# Patient Record
Sex: FEMALE | Race: WHITE | NOT HISPANIC OR LATINO | Employment: UNEMPLOYED | URBAN - METROPOLITAN AREA
[De-identification: names, ages, dates, MRNs, and addresses within clinical notes are randomized per-mention and may not be internally consistent; named-entity substitution may affect disease eponyms.]

---

## 2021-08-26 PROBLEM — B95.2 UTI (URINARY TRACT INFECTION) DUE TO ENTEROCOCCUS: Status: ACTIVE | Noted: 2021-02-19

## 2021-08-26 PROBLEM — N39.0 UTI (URINARY TRACT INFECTION) DUE TO ENTEROCOCCUS: Status: ACTIVE | Noted: 2021-02-19

## 2021-08-26 PROBLEM — Z78.9 RUBELLA IMMUNE: Status: ACTIVE | Noted: 2021-08-26

## 2021-08-26 PROBLEM — U07.1 COVID-19: Status: ACTIVE | Noted: 2021-08-26

## 2021-08-26 PROBLEM — Z91.51 HISTORY OF SUICIDE ATTEMPT: Status: ACTIVE | Noted: 2021-08-26

## 2021-11-08 PROBLEM — U07.1 COVID-19: Status: RESOLVED | Noted: 2021-08-26 | Resolved: 2021-11-08

## 2021-11-08 PROBLEM — B95.2 UTI (URINARY TRACT INFECTION) DUE TO ENTEROCOCCUS: Status: RESOLVED | Noted: 2021-02-19 | Resolved: 2021-11-08

## 2021-11-08 PROBLEM — N39.0 UTI (URINARY TRACT INFECTION) DUE TO ENTEROCOCCUS: Status: RESOLVED | Noted: 2021-02-19 | Resolved: 2021-11-08

## 2021-11-08 PROBLEM — Z78.9 RUBELLA IMMUNE: Status: RESOLVED | Noted: 2021-08-26 | Resolved: 2021-11-08

## 2021-11-08 PROBLEM — F41.9 ANXIETY: Status: ACTIVE | Noted: 2021-11-08

## 2021-11-08 PROBLEM — Z91.51 HISTORY OF SUICIDE ATTEMPT: Status: RESOLVED | Noted: 2021-08-26 | Resolved: 2021-11-08

## 2021-12-27 PROBLEM — Z30.9 CONTRACEPTION MANAGEMENT: Status: ACTIVE | Noted: 2021-12-27

## 2021-12-27 PROBLEM — Z23 NEED FOR HPV VACCINATION: Status: ACTIVE | Noted: 2021-12-27

## 2023-08-02 ENCOUNTER — LAB VISIT (OUTPATIENT)
Dept: LAB | Facility: HOSPITAL | Age: 23
End: 2023-08-02
Attending: OBSTETRICS & GYNECOLOGY
Payer: MEDICAID

## 2023-08-02 DIAGNOSIS — Z34.91 FIRST TRIMESTER PREGNANCY: Primary | ICD-10-CM

## 2023-08-02 LAB
ERYTHROCYTE [DISTWIDTH] IN BLOOD BY AUTOMATED COUNT: 12.5 % (ref 11.5–17)
GROUP & RH: NORMAL
HBV SURFACE AG SERPL QL IA: NONREACTIVE
HCT VFR BLD AUTO: 35.8 % (ref 37–47)
HGB BLD-MCNC: 11.9 G/DL (ref 12–16)
HIV 1+2 AB+HIV1 P24 AG SERPL QL IA: NONREACTIVE
INDIRECT COOMBS GEL: NORMAL
MCH RBC QN AUTO: 31.3 PG (ref 27–31)
MCHC RBC AUTO-ENTMCNC: 33.2 G/DL (ref 33–36)
MCV RBC AUTO: 94.2 FL (ref 80–94)
NRBC BLD AUTO-RTO: 0 %
PLATELET # BLD AUTO: 245 X10(3)/MCL (ref 130–400)
PMV BLD AUTO: 10.1 FL (ref 7.4–10.4)
RBC # BLD AUTO: 3.8 X10(6)/MCL (ref 4.2–5.4)
SPECIMEN OUTDATE: NORMAL
T PALLIDUM AB SER QL: NONREACTIVE
WBC # SPEC AUTO: 6.31 X10(3)/MCL (ref 4.5–11.5)

## 2023-08-02 PROCEDURE — 87088 URINE BACTERIA CULTURE: CPT

## 2023-08-02 PROCEDURE — 87340 HEPATITIS B SURFACE AG IA: CPT

## 2023-08-02 PROCEDURE — 36415 COLL VENOUS BLD VENIPUNCTURE: CPT

## 2023-08-02 PROCEDURE — 85027 COMPLETE CBC AUTOMATED: CPT

## 2023-08-02 PROCEDURE — 87389 HIV-1 AG W/HIV-1&-2 AB AG IA: CPT

## 2023-08-02 PROCEDURE — 86900 BLOOD TYPING SEROLOGIC ABO: CPT | Performed by: OBSTETRICS & GYNECOLOGY

## 2023-08-02 PROCEDURE — 86780 TREPONEMA PALLIDUM: CPT

## 2023-08-02 PROCEDURE — 85660 RBC SICKLE CELL TEST: CPT

## 2023-08-02 PROCEDURE — 86762 RUBELLA ANTIBODY: CPT

## 2023-08-03 LAB
HGB S BLD QL SOLY: NEGATIVE
RUBV IGG SERPL IA-ACNC: 4.2
RUBV IGG SERPL QL IA: POSITIVE

## 2023-08-04 LAB — BACTERIA UR CULT: NORMAL

## 2023-11-28 ENCOUNTER — LAB VISIT (OUTPATIENT)
Dept: LAB | Facility: HOSPITAL | Age: 23
End: 2023-11-28
Attending: OBSTETRICS & GYNECOLOGY
Payer: MEDICAID

## 2023-11-28 DIAGNOSIS — Z34.80 PRENATAL CARE, SUBSEQUENT PREGNANCY: Primary | ICD-10-CM

## 2023-11-28 LAB
ERYTHROCYTE [DISTWIDTH] IN BLOOD BY AUTOMATED COUNT: 13.3 % (ref 11.5–17)
GLUCOSE 1H P 100 G GLC PO SERPL-MCNC: 139 MG/DL (ref 100–180)
HCT VFR BLD AUTO: 35.9 % (ref 37–47)
HGB BLD-MCNC: 12.3 G/DL (ref 12–16)
MCH RBC QN AUTO: 32.4 PG (ref 27–31)
MCHC RBC AUTO-ENTMCNC: 34.3 G/DL (ref 33–36)
MCV RBC AUTO: 94.5 FL (ref 80–94)
NRBC BLD AUTO-RTO: 0 %
PLATELET # BLD AUTO: 213 X10(3)/MCL (ref 130–400)
PMV BLD AUTO: 9.6 FL (ref 7.4–10.4)
RBC # BLD AUTO: 3.8 X10(6)/MCL (ref 4.2–5.4)
WBC # SPEC AUTO: 7.78 X10(3)/MCL (ref 4.5–11.5)

## 2023-11-28 PROCEDURE — 36415 COLL VENOUS BLD VENIPUNCTURE: CPT

## 2023-11-28 PROCEDURE — 82950 GLUCOSE TEST: CPT

## 2023-11-28 PROCEDURE — 85027 COMPLETE CBC AUTOMATED: CPT

## 2023-12-16 ENCOUNTER — HOSPITAL ENCOUNTER (INPATIENT)
Facility: HOSPITAL | Age: 23
LOS: 2 days | Discharge: HOME | End: 2023-12-18
Attending: OBSTETRICS & GYNECOLOGY | Admitting: OBSTETRICS & GYNECOLOGY
Payer: MEDICAID

## 2023-12-16 DIAGNOSIS — O36.4XX0 FETAL DEMISE, GREATER THAN 22 WEEKS, ANTEPARTUM: ICD-10-CM

## 2023-12-16 LAB
BASOPHILS # BLD AUTO: 0.02 X10(3)/MCL
BASOPHILS NFR BLD AUTO: 0.2 %
EOSINOPHIL # BLD AUTO: 0.02 X10(3)/MCL (ref 0–0.9)
EOSINOPHIL NFR BLD AUTO: 0.2 %
ERYTHROCYTE [DISTWIDTH] IN BLOOD BY AUTOMATED COUNT: 13.2 % (ref 11.5–17)
GROUP & RH: NORMAL
HCT VFR BLD AUTO: 36.9 % (ref 37–47)
HGB BLD-MCNC: 12.9 G/DL (ref 12–16)
IMM GRANULOCYTES # BLD AUTO: 0.07 X10(3)/MCL (ref 0–0.04)
IMM GRANULOCYTES NFR BLD AUTO: 0.7 %
INDIRECT COOMBS GEL: NORMAL
LYMPHOCYTES # BLD AUTO: 1.51 X10(3)/MCL (ref 0.6–4.6)
LYMPHOCYTES NFR BLD AUTO: 14.3 %
MCH RBC QN AUTO: 32.4 PG (ref 27–31)
MCHC RBC AUTO-ENTMCNC: 35 G/DL (ref 33–36)
MCV RBC AUTO: 92.7 FL (ref 80–94)
MONOCYTES # BLD AUTO: 0.45 X10(3)/MCL (ref 0.1–1.3)
MONOCYTES NFR BLD AUTO: 4.3 %
NEUTROPHILS # BLD AUTO: 8.5 X10(3)/MCL (ref 2.1–9.2)
NEUTROPHILS NFR BLD AUTO: 80.3 %
NRBC BLD AUTO-RTO: 0 %
PLATELET # BLD AUTO: 208 X10(3)/MCL (ref 130–400)
PMV BLD AUTO: 9.7 FL (ref 7.4–10.4)
RBC # BLD AUTO: 3.98 X10(6)/MCL (ref 4.2–5.4)
SPECIMEN OUTDATE: NORMAL
WBC # SPEC AUTO: 10.57 X10(3)/MCL (ref 4.5–11.5)

## 2023-12-16 PROCEDURE — 25000003 PHARM REV CODE 250: Performed by: OBSTETRICS & GYNECOLOGY

## 2023-12-16 PROCEDURE — 85025 COMPLETE CBC W/AUTO DIFF WBC: CPT | Performed by: OBSTETRICS & GYNECOLOGY

## 2023-12-16 PROCEDURE — 25000003 PHARM REV CODE 250

## 2023-12-16 PROCEDURE — 86901 BLOOD TYPING SEROLOGIC RH(D): CPT | Performed by: OBSTETRICS & GYNECOLOGY

## 2023-12-16 PROCEDURE — 63600175 PHARM REV CODE 636 W HCPCS: Performed by: OBSTETRICS & GYNECOLOGY

## 2023-12-16 PROCEDURE — 11000001 HC ACUTE MED/SURG PRIVATE ROOM

## 2023-12-16 PROCEDURE — 51702 INSERT TEMP BLADDER CATH: CPT

## 2023-12-16 RX ORDER — OXYTOCIN 10 [USP'U]/ML
10 INJECTION, SOLUTION INTRAMUSCULAR; INTRAVENOUS ONCE AS NEEDED
Status: DISCONTINUED | OUTPATIENT
Start: 2023-12-16 | End: 2023-12-18 | Stop reason: HOSPADM

## 2023-12-16 RX ORDER — OXYTOCIN/RINGER'S LACTATE 30/500 ML
334 PLASTIC BAG, INJECTION (ML) INTRAVENOUS ONCE AS NEEDED
Status: DISCONTINUED | OUTPATIENT
Start: 2023-12-16 | End: 2023-12-18 | Stop reason: HOSPADM

## 2023-12-16 RX ORDER — PROCHLORPERAZINE EDISYLATE 5 MG/ML
5 INJECTION INTRAMUSCULAR; INTRAVENOUS EVERY 6 HOURS PRN
Status: DISCONTINUED | OUTPATIENT
Start: 2023-12-16 | End: 2023-12-17

## 2023-12-16 RX ORDER — SODIUM CITRATE AND CITRIC ACID MONOHYDRATE 334; 500 MG/5ML; MG/5ML
30 SOLUTION ORAL ONCE
Status: CANCELLED | OUTPATIENT
Start: 2023-12-16 | End: 2023-12-16

## 2023-12-16 RX ORDER — MISOPROSTOL 100 UG/1
100 TABLET ORAL EVERY 4 HOURS PRN
Status: DISCONTINUED | OUTPATIENT
Start: 2023-12-16 | End: 2023-12-16

## 2023-12-16 RX ORDER — SODIUM CHLORIDE, SODIUM LACTATE, POTASSIUM CHLORIDE, CALCIUM CHLORIDE 600; 310; 30; 20 MG/100ML; MG/100ML; MG/100ML; MG/100ML
INJECTION, SOLUTION INTRAVENOUS CONTINUOUS
Status: DISCONTINUED | OUTPATIENT
Start: 2023-12-16 | End: 2023-12-17

## 2023-12-16 RX ORDER — CALCIUM CARBONATE 200(500)MG
500 TABLET,CHEWABLE ORAL 3 TIMES DAILY PRN
Status: DISCONTINUED | OUTPATIENT
Start: 2023-12-16 | End: 2023-12-18 | Stop reason: HOSPADM

## 2023-12-16 RX ORDER — ACETAMINOPHEN 325 MG/1
650 TABLET ORAL EVERY 6 HOURS PRN
Status: DISCONTINUED | OUTPATIENT
Start: 2023-12-16 | End: 2023-12-18 | Stop reason: HOSPADM

## 2023-12-16 RX ORDER — LIDOCAINE HYDROCHLORIDE 10 MG/ML
10 INJECTION INFILTRATION; PERINEURAL ONCE AS NEEDED
Status: DISCONTINUED | OUTPATIENT
Start: 2023-12-16 | End: 2023-12-17

## 2023-12-16 RX ORDER — MISOPROSTOL 100 UG/1
800 TABLET ORAL ONCE AS NEEDED
Status: DISCONTINUED | OUTPATIENT
Start: 2023-12-16 | End: 2023-12-18 | Stop reason: HOSPADM

## 2023-12-16 RX ORDER — EPHEDRINE SULFATE 50 MG/ML
INJECTION, SOLUTION INTRAVENOUS
Status: COMPLETED
Start: 2023-12-16 | End: 2023-12-16

## 2023-12-16 RX ORDER — MISOPROSTOL 100 UG/1
100 TABLET ORAL EVERY 4 HOURS
Status: DISCONTINUED | OUTPATIENT
Start: 2023-12-16 | End: 2023-12-17

## 2023-12-16 RX ORDER — OXYTOCIN/RINGER'S LACTATE 30/500 ML
95 PLASTIC BAG, INJECTION (ML) INTRAVENOUS ONCE
Status: DISCONTINUED | OUTPATIENT
Start: 2023-12-16 | End: 2023-12-18 | Stop reason: HOSPADM

## 2023-12-16 RX ORDER — OXYTOCIN/RINGER'S LACTATE 30/500 ML
334 PLASTIC BAG, INJECTION (ML) INTRAVENOUS ONCE
Status: COMPLETED | OUTPATIENT
Start: 2023-12-16 | End: 2023-12-17

## 2023-12-16 RX ORDER — DIPHENOXYLATE HYDROCHLORIDE AND ATROPINE SULFATE 2.5; .025 MG/1; MG/1
2 TABLET ORAL EVERY 6 HOURS PRN
Status: DISCONTINUED | OUTPATIENT
Start: 2023-12-16 | End: 2023-12-18 | Stop reason: HOSPADM

## 2023-12-16 RX ORDER — SIMETHICONE 80 MG
1 TABLET,CHEWABLE ORAL 4 TIMES DAILY PRN
Status: DISCONTINUED | OUTPATIENT
Start: 2023-12-16 | End: 2023-12-17

## 2023-12-16 RX ORDER — OXYTOCIN/RINGER'S LACTATE 30/500 ML
95 PLASTIC BAG, INJECTION (ML) INTRAVENOUS ONCE AS NEEDED
Status: DISCONTINUED | OUTPATIENT
Start: 2023-12-16 | End: 2023-12-18 | Stop reason: HOSPADM

## 2023-12-16 RX ORDER — ONDANSETRON 2 MG/ML
4 INJECTION INTRAMUSCULAR; INTRAVENOUS EVERY 6 HOURS PRN
Status: DISCONTINUED | OUTPATIENT
Start: 2023-12-16 | End: 2023-12-18 | Stop reason: HOSPADM

## 2023-12-16 RX ORDER — METHYLERGONOVINE MALEATE 0.2 MG/ML
200 INJECTION INTRAVENOUS ONCE AS NEEDED
Status: COMPLETED | OUTPATIENT
Start: 2023-12-16 | End: 2023-12-17

## 2023-12-16 RX ORDER — CARBOPROST TROMETHAMINE 250 UG/ML
250 INJECTION, SOLUTION INTRAMUSCULAR
Status: DISCONTINUED | OUTPATIENT
Start: 2023-12-16 | End: 2023-12-18 | Stop reason: HOSPADM

## 2023-12-16 RX ORDER — EPHEDRINE SULFATE 50 MG/ML
10 INJECTION, SOLUTION INTRAVENOUS ONCE AS NEEDED
Status: CANCELLED | OUTPATIENT
Start: 2023-12-16 | End: 2035-05-14

## 2023-12-16 RX ORDER — FENTANYL/BUPIVACAINE/NS/PF 2-1250MCG
PLASTIC BAG, INJECTION (ML) INJECTION CONTINUOUS
Status: DISCONTINUED | OUTPATIENT
Start: 2023-12-16 | End: 2023-12-17

## 2023-12-16 RX ADMIN — EPHEDRINE SULFATE 10 MG: 50 INJECTION INTRAVENOUS at 08:12

## 2023-12-16 RX ADMIN — MISOPROSTOL 100 MCG: 100 TABLET ORAL at 06:12

## 2023-12-16 RX ADMIN — MISOPROSTOL 100 MCG: 100 TABLET ORAL at 10:12

## 2023-12-16 RX ADMIN — SODIUM CHLORIDE, POTASSIUM CHLORIDE, SODIUM LACTATE AND CALCIUM CHLORIDE: 600; 310; 30; 20 INJECTION, SOLUTION INTRAVENOUS at 06:12

## 2023-12-17 PROBLEM — Z3A.32 32 WEEKS GESTATION OF PREGNANCY: Status: ACTIVE | Noted: 2023-12-17

## 2023-12-17 LAB
ALBUMIN SERPL-MCNC: 2.5 G/DL (ref 3.5–5)
ALBUMIN/GLOB SERPL: 1 RATIO (ref 1.1–2)
ALP SERPL-CCNC: 91 UNIT/L (ref 40–150)
ALT SERPL-CCNC: 8 UNIT/L (ref 0–55)
APTT PPP: 25.8 SECONDS (ref 23.2–33.7)
AST SERPL-CCNC: 13 UNIT/L (ref 5–34)
BASOPHILS # BLD AUTO: 0.03 X10(3)/MCL
BASOPHILS # BLD AUTO: 0.04 X10(3)/MCL
BASOPHILS NFR BLD AUTO: 0.2 %
BASOPHILS NFR BLD AUTO: 0.3 %
BILIRUB SERPL-MCNC: 0.5 MG/DL
BUN SERPL-MCNC: 3 MG/DL (ref 7–18.7)
CALCIUM SERPL-MCNC: 8.1 MG/DL (ref 8.4–10.2)
CHLORIDE SERPL-SCNC: 110 MMOL/L (ref 98–107)
CO2 SERPL-SCNC: 21 MMOL/L (ref 22–29)
CREAT SERPL-MCNC: 0.54 MG/DL (ref 0.55–1.02)
EOSINOPHIL # BLD AUTO: 0.01 X10(3)/MCL (ref 0–0.9)
EOSINOPHIL # BLD AUTO: 0.02 X10(3)/MCL (ref 0–0.9)
EOSINOPHIL NFR BLD AUTO: 0.1 %
EOSINOPHIL NFR BLD AUTO: 0.2 %
ERYTHROCYTE [DISTWIDTH] IN BLOOD BY AUTOMATED COUNT: 13.1 % (ref 11.5–17)
ERYTHROCYTE [DISTWIDTH] IN BLOOD BY AUTOMATED COUNT: 13.2 % (ref 11.5–17)
FIBRINOGEN PPP-MCNC: 305 MG/DL (ref 210–463)
GFR SERPLBLD CREATININE-BSD FMLA CKD-EPI: >60 MLS/MIN/1.73/M2
GLOBULIN SER-MCNC: 2.4 GM/DL (ref 2.4–3.5)
GLUCOSE SERPL-MCNC: 103 MG/DL (ref 74–100)
HCT VFR BLD AUTO: 30.8 % (ref 37–47)
HCT VFR BLD AUTO: 32.2 % (ref 37–47)
HGB BLD-MCNC: 10.7 G/DL (ref 12–16)
HGB BLD-MCNC: 11.2 G/DL (ref 12–16)
IMM GRANULOCYTES # BLD AUTO: 0.07 X10(3)/MCL (ref 0–0.04)
IMM GRANULOCYTES # BLD AUTO: 0.11 X10(3)/MCL (ref 0–0.04)
IMM GRANULOCYTES NFR BLD AUTO: 0.6 %
IMM GRANULOCYTES NFR BLD AUTO: 0.7 %
INR PPP: 1
LYMPHOCYTES # BLD AUTO: 2.13 X10(3)/MCL (ref 0.6–4.6)
LYMPHOCYTES # BLD AUTO: 2.67 X10(3)/MCL (ref 0.6–4.6)
LYMPHOCYTES NFR BLD AUTO: 12.3 %
LYMPHOCYTES NFR BLD AUTO: 24.9 %
MCH RBC QN AUTO: 32.5 PG (ref 27–31)
MCH RBC QN AUTO: 32.7 PG (ref 27–31)
MCHC RBC AUTO-ENTMCNC: 34.7 G/DL (ref 33–36)
MCHC RBC AUTO-ENTMCNC: 34.8 G/DL (ref 33–36)
MCV RBC AUTO: 93.6 FL (ref 80–94)
MCV RBC AUTO: 94.2 FL (ref 80–94)
MONOCYTES # BLD AUTO: 0.64 X10(3)/MCL (ref 0.1–1.3)
MONOCYTES # BLD AUTO: 1 X10(3)/MCL (ref 0.1–1.3)
MONOCYTES NFR BLD AUTO: 5.8 %
MONOCYTES NFR BLD AUTO: 6 %
NEUTROPHILS # BLD AUTO: 14.06 X10(3)/MCL (ref 2.1–9.2)
NEUTROPHILS # BLD AUTO: 7.29 X10(3)/MCL (ref 2.1–9.2)
NEUTROPHILS NFR BLD AUTO: 67.9 %
NEUTROPHILS NFR BLD AUTO: 81 %
NRBC BLD AUTO-RTO: 0 %
NRBC BLD AUTO-RTO: 0 %
PLATELET # BLD AUTO: 214 X10(3)/MCL (ref 130–400)
PLATELET # BLD AUTO: 232 X10(3)/MCL (ref 130–400)
PMV BLD AUTO: 9.6 FL (ref 7.4–10.4)
PMV BLD AUTO: 9.7 FL (ref 7.4–10.4)
POTASSIUM SERPL-SCNC: 3.5 MMOL/L (ref 3.5–5.1)
PROT SERPL-MCNC: 4.9 GM/DL (ref 6.4–8.3)
PROTHROMBIN TIME: 13.2 SECONDS (ref 12.5–14.5)
RBC # BLD AUTO: 3.29 X10(6)/MCL (ref 4.2–5.4)
RBC # BLD AUTO: 3.42 X10(6)/MCL (ref 4.2–5.4)
SODIUM SERPL-SCNC: 139 MMOL/L (ref 136–145)
WBC # SPEC AUTO: 10.72 X10(3)/MCL (ref 4.5–11.5)
WBC # SPEC AUTO: 17.35 X10(3)/MCL (ref 4.5–11.5)

## 2023-12-17 PROCEDURE — 85730 THROMBOPLASTIN TIME PARTIAL: CPT | Performed by: OBSTETRICS & GYNECOLOGY

## 2023-12-17 PROCEDURE — 72100002 HC LABOR CARE, 1ST 8 HOURS

## 2023-12-17 PROCEDURE — 25000003 PHARM REV CODE 250: Performed by: OBSTETRICS & GYNECOLOGY

## 2023-12-17 PROCEDURE — 72200006 HC VAGINAL DELIVERY LEVEL III

## 2023-12-17 PROCEDURE — 85610 PROTHROMBIN TIME: CPT | Performed by: OBSTETRICS & GYNECOLOGY

## 2023-12-17 PROCEDURE — 85384 FIBRINOGEN ACTIVITY: CPT | Performed by: OBSTETRICS & GYNECOLOGY

## 2023-12-17 PROCEDURE — 63600175 PHARM REV CODE 636 W HCPCS: Performed by: OBSTETRICS & GYNECOLOGY

## 2023-12-17 PROCEDURE — 11000001 HC ACUTE MED/SURG PRIVATE ROOM

## 2023-12-17 PROCEDURE — 88307 TISSUE EXAM BY PATHOLOGIST: CPT | Performed by: OBSTETRICS & GYNECOLOGY

## 2023-12-17 PROCEDURE — 86923 COMPATIBILITY TEST ELECTRIC: CPT | Performed by: OBSTETRICS & GYNECOLOGY

## 2023-12-17 PROCEDURE — 85025 COMPLETE CBC W/AUTO DIFF WBC: CPT | Performed by: OBSTETRICS & GYNECOLOGY

## 2023-12-17 PROCEDURE — 80053 COMPREHEN METABOLIC PANEL: CPT | Performed by: OBSTETRICS & GYNECOLOGY

## 2023-12-17 RX ORDER — SIMETHICONE 80 MG
1 TABLET,CHEWABLE ORAL EVERY 6 HOURS PRN
Status: DISCONTINUED | OUTPATIENT
Start: 2023-12-17 | End: 2023-12-18 | Stop reason: HOSPADM

## 2023-12-17 RX ORDER — IBUPROFEN 600 MG/1
600 TABLET ORAL EVERY 6 HOURS PRN
Status: DISCONTINUED | OUTPATIENT
Start: 2023-12-17 | End: 2023-12-18 | Stop reason: HOSPADM

## 2023-12-17 RX ORDER — ONDANSETRON 4 MG/1
8 TABLET, ORALLY DISINTEGRATING ORAL EVERY 8 HOURS PRN
Status: DISCONTINUED | OUTPATIENT
Start: 2023-12-17 | End: 2023-12-18 | Stop reason: HOSPADM

## 2023-12-17 RX ORDER — HYDROCORTISONE 25 MG/G
CREAM TOPICAL 3 TIMES DAILY PRN
Status: DISCONTINUED | OUTPATIENT
Start: 2023-12-17 | End: 2023-12-18 | Stop reason: HOSPADM

## 2023-12-17 RX ORDER — HYDROCODONE BITARTRATE AND ACETAMINOPHEN 500; 5 MG/1; MG/1
TABLET ORAL
Status: DISCONTINUED | OUTPATIENT
Start: 2023-12-17 | End: 2023-12-18 | Stop reason: HOSPADM

## 2023-12-17 RX ORDER — DOCUSATE SODIUM 100 MG/1
200 CAPSULE, LIQUID FILLED ORAL 2 TIMES DAILY PRN
Status: DISCONTINUED | OUTPATIENT
Start: 2023-12-17 | End: 2023-12-18 | Stop reason: HOSPADM

## 2023-12-17 RX ORDER — PROCHLORPERAZINE EDISYLATE 5 MG/ML
5 INJECTION INTRAMUSCULAR; INTRAVENOUS EVERY 6 HOURS PRN
Status: DISCONTINUED | OUTPATIENT
Start: 2023-12-17 | End: 2023-12-18 | Stop reason: HOSPADM

## 2023-12-17 RX ORDER — ACETAMINOPHEN 325 MG/1
650 TABLET ORAL EVERY 6 HOURS SCHEDULED
Status: DISCONTINUED | OUTPATIENT
Start: 2023-12-17 | End: 2023-12-17

## 2023-12-17 RX ORDER — OXYCODONE AND ACETAMINOPHEN 5; 325 MG/1; MG/1
1 TABLET ORAL EVERY 4 HOURS PRN
Status: DISCONTINUED | OUTPATIENT
Start: 2023-12-17 | End: 2023-12-18 | Stop reason: HOSPADM

## 2023-12-17 RX ORDER — OXYCODONE AND ACETAMINOPHEN 10; 325 MG/1; MG/1
1 TABLET ORAL EVERY 4 HOURS PRN
Status: DISCONTINUED | OUTPATIENT
Start: 2023-12-17 | End: 2023-12-18 | Stop reason: HOSPADM

## 2023-12-17 RX ADMIN — Medication 334 MILLI-UNITS/MIN: at 01:12

## 2023-12-17 RX ADMIN — TRANEXAMIC ACID 1000 MG: 100 INJECTION, SOLUTION INTRAVENOUS at 02:12

## 2023-12-17 RX ADMIN — ACETAMINOPHEN 325MG 650 MG: 325 TABLET ORAL at 12:12

## 2023-12-17 RX ADMIN — IBUPROFEN 600 MG: 600 TABLET, FILM COATED ORAL at 05:12

## 2023-12-17 RX ADMIN — ONDANSETRON 4 MG: 2 INJECTION INTRAMUSCULAR; INTRAVENOUS at 02:12

## 2023-12-17 RX ADMIN — IBUPROFEN 600 MG: 600 TABLET, FILM COATED ORAL at 12:12

## 2023-12-17 RX ADMIN — METHYLERGONOVINE MALEATE 200 MCG: 0.2 INJECTION, SOLUTION INTRAMUSCULAR; INTRAVENOUS at 01:12

## 2023-12-17 NOTE — H&P
[facility] - Labor and Delivery  Obstetrics  History & Physical    Patient Name: Viviane Garnica  MRN: [id number]  Admission Date: 2023  Primary Care Provider: Reny, Primary Doctor    Subjective:     Principal Problem:<principal problem not specified>    History of Present Illness: 32 weeks with IUFD    Obstetric HPI:  Patient reports None contractions, decreased  fetal movement, No vaginal bleeding , No loss of fluid     This pregnancy has been complicated by no issues    OB History    Para Term  AB Living   2 1 1 0 0 1   SAB IAB Ectopic Multiple Live Births   0 0 0 0 1      # Outcome Date GA Lbr Ward/2nd Weight Sex Delivery Anes PTL Lv   2 Current            1 Term 21 37w6d  2.892 kg (6 lb 6 oz) F Vag-Spont EPI N       Name: Nadia     Past Medical History:   Diagnosis Date    Anxiety state 2021 1:28:10 PM    [facility] - [facility]: Anxiety-No Additional Notes    Anxiety state 2021 1:28:10 PM    [facility] - [facility]: Anxiety-No Additional Notes    Depression     Mental disorder     Anxiety and Depression    Other depressive disorder 2021 1:28:03 PM    [facility]: Depression-No Additional Notes    Other depressive disorder 2021 1:28:03 PM    [facility] - HA: Depression-No Additional Notes     Past Surgical History:   Procedure Laterality Date    HAND SURGERY      TONSILLECTOMY      WISDOM TOOTH EXTRACTION         PTA Medications   Medication Sig    prenatal vit103-iron fum-folic () 27 mg iron- 1 mg Tab  27 mg iron-1 mg tablet take 1 tablet by oral route once daily for 30 days 2021  Active    PROCTOSOL HC 2.5 % rectal cream     ALPRAZolam (XANAX) 0.5 MG tablet     dextroamphetamine-amphetamine (ADDERALL) 15 mg tablet     ibuprofen (ADVIL,MOTRIN) 600 MG tablet     norelgestromin-ethinyl estradiol (XULANE) 150-35 mcg/24 hr Apply 1 patch by transdermal route once weekly for 3 weeks.        Review of patient's allergies indicates:  No Known Allergies     Family History       Problem Relation (Age of Onset)    Breast cancer Maternal Grandmother    Hypertension Mother          Tobacco Use    Smoking status: Never    Smokeless tobacco: Never   Substance and Sexual Activity    Alcohol use: Not Currently    Drug use: Never    Sexual activity: Yes     Partners: Male     Review of Systems   Objective:     Vital Signs (Most Recent):  Temp: 98.9 °F (37.2 °C) (230)  Pulse: (!) 116 (23)  Resp: 18 (23)  BP: (!) 99/57 (23)  SpO2: 96 % (23) Vital Signs (24h Range):  Temp:  [98.9 °F (37.2 °C)-99.5 °F (37.5 °C)] 98.9 °F (37.2 °C)  Pulse:  [] 116  Resp:  [12-18] 12  SpO2:  [96 %-100 %] 96 %  BP: ()/(57-98) 99/57     Weight: 61.2 kg (135 lb)  Body mass index is 24.69 kg/m².        Physical Exam    Cervix:  Dilation:  1  Effacement:  25%  Station: -3  Presentation: Breech     Significant Labs:  Lab Results   Component Value Date    GROUPTRH O POS 2023    HEPBSAG Negative 2021       I have personallly reviewed all pertinent lab results from the last 24 hours.    Assessment/Plan:     23 y.o. female  at 32w3d for:    There are no hospital problems to display for this patient.      IUFD, will proceed with induction as per patient request    Eric Tate MD  Obstetrics  [facility] - Labor and Delivery

## 2023-12-17 NOTE — L&D DELIVERY NOTE
"[facility] - 2nd Floor Mother/Baby Unit  Vaginal Delivery   Operative Note    SUMMARY     Normal spontaneous vaginal delivery of fetal demise,   Infant delivered position OA over intact perineum.  Nuchal cord: No.    Spontaneous delivery of placenta and IV pitocin given noting good uterine tone.  No lacerations noted.  Patient tolerated delivery well. Sponge needle and lap counted correctly x2.  Partial placenta abruption, noted on delivery of placenta  Indications: <principal problem not specified>  Pregnancy complicated by:   Patient Active Problem List   Diagnosis    Anxiety    Postpartum exam    Contraception management    Need for HPV vaccination     Admitting GA: 32w3d    Delivery Information for Girl Viviane Garnica    Birth information:  YOB: 2024   Time of birth: 1:52 AM   Sex: female   Head Delivery Date/Time: 2023  1:52 AM   Delivery type: Vaginal, Spontaneous   Gestational Age: 32w3d        Delivery Providers    Delivering clinician: Eric Tate MD   Provider Role    Radha Dave RN Wiley, Tristan, RN Vige', Mary Elise, RN Saunders, Valerie, RN               Measurements    Weight: 1871 g  Weight (lbs): 4 lb 2 oz  Length: 43.2 cm  Length (in): 17"         Apgars    Living status: Fetal Demise  Apgar Component Scores:  1 min.:  5 min.:  10 min.:  15 min.:  20 min.:    Skin color:  0  0  0  0  0    Heart rate:  0  0  0  0  0    Reflex irritability:  0  0  0  0  0    Muscle tone:  0  0  0  0  0    Respiratory effort:  0  0  0  0  0    Total:  0  0  0  0  0             Operative Delivery    Forceps attempted?: No  Vacuum extractor attempted?: No         Shoulder Dystocia    Shoulder dystocia present?: No           Presentation    Presentation: Vertex  Position: Middle           Interventions/Resuscitation           Cord    Vessels: 3 vessels  Complications: None  Delayed Cord Clamping?: No  Cord Blood Disposition: Discarded  Gases Sent?: " No  Stem Cell Collection (by MD): No       Placenta    Placenta delivery date/time: 2023 0153  Placenta removal: Spontaneous  Placenta appearance: Intact  Placenta disposition: Pathology           Labor Events:       labor: Yes     Labor Onset Date/Time: 2023 18:20     Dilation Complete Date/Time: 2023 01:43     Start Pushing Date/Time: 2023 01:44     Rupture Date/Time:            Rupture type:          Fluid Amount:       Fluid Color:        steroids: None     Antibiotics given for GBS: No     Induction:       Indications for induction:        Augmentation:       Indications for augmentation:       Labor complications: Abruptio Placenta     Additional complications:          Cervical ripening:                     Delivery:      Episiotomy: None     Indication for Episiotomy:       Perineal Lacerations: None Repaired:      Periurethral Laceration:   Repaired:     Labial Laceration:   Repaired:     Sulcus Laceration:   Repaired:     Vaginal Laceration:   Repaired:     Cervical Laceration:   Repaired:     Repair suture: None     Repair # of packets:       Last Value - EBL - Nursing (mL):       Sum - EBL - Nursing (mL): 0     Last Value - EBL - Anesthesia (mL):      Calculated QBL (mL): 200      Vaginal Sweep Performed: Yes     Surgicount Correct: Yes       Other providers:       Anesthesia    Method: Epidural          Details (if applicable):  Trial of Labor      Categorization:      Priority:     Indications for :     Incision Type:       Additional  information:  Forceps:    Vacuum:    Breech:    Observed anomalies    Other (Comments):

## 2023-12-17 NOTE — PLAN OF CARE
Problem: Adult Inpatient Plan of Care  Goal: Plan of Care Review  Outcome: Ongoing, Progressing  Goal: Patient-Specific Goal (Individualized)  Outcome: Ongoing, Progressing  Goal: Absence of Hospital-Acquired Illness or Injury  Outcome: Ongoing, Progressing  Goal: Optimal Comfort and Wellbeing  Outcome: Ongoing, Progressing  Goal: Readiness for Transition of Care  Outcome: Ongoing, Progressing     Problem:  Fall Injury Risk  Goal: Absence of Fall, Infant Drop and Related Injury  Outcome: Ongoing, Progressing     Problem: Infection  Goal: Absence of Infection Signs and Symptoms  Outcome: Ongoing, Progressing     
Preventive measure

## 2023-12-18 VITALS
OXYGEN SATURATION: 96 % | HEIGHT: 62 IN | DIASTOLIC BLOOD PRESSURE: 58 MMHG | TEMPERATURE: 98 F | RESPIRATION RATE: 18 BRPM | BODY MASS INDEX: 24.84 KG/M2 | WEIGHT: 135 LBS | HEART RATE: 71 BPM | SYSTOLIC BLOOD PRESSURE: 93 MMHG

## 2023-12-18 NOTE — CONSULTS
"CM consulted for 32 wga fetal loss. Met with mom, Viviane Garnica and FOB, Brian Barr in post partum room, both expressed appropriate emotions and grief. I offered my support and comfort. Dad was holding and bonding with their baby girl at mom's bedside.  Discussed loss process and the options with parents, parents are planning to use [facility] in [place] for burial. Discussed [facility] footprints and dad and mom denied referral. Dad stated, "No, we are ok, I will make sure everything is taken care of for my baby girl." I explained [facility]'s also provide emotional support and provided mom with additional information and resources in the event the family chooses to utilize their support services. Provided additional mental health and grief resources and offered emotional support again. Main contact is mom ([phone]) or dad ([phone]). Both mom and dad had support present during assessment and denied having any additional needs or questions.     Baby girl, Юлия Mota-32.2 wga   DOL 2023.     Updated GEORGINA Maajno regarding completed consult.   "

## 2023-12-18 NOTE — NURSING
0750-parents and grandmother asked if mom could take baby downstairs to bring her outside. Sat down and spoke with parents about bringing the baby out of the room. We ultimately decided it was not a good idea. Parents and grandmother are ok with decision.

## 2023-12-19 LAB — PSYCHE PATHOLOGY RESULT: NORMAL

## 2023-12-19 NOTE — DISCHARGE SUMMARY
"[facility] - 2nd Floor Mother/Baby Unit  Obstetrics  Discharge Summary      Patient Name: Viviane Garnica  MRN: [id number]  Admission Date: 2023  Hospital Length of Stay: 2 days  Discharge Date and Time:  2023 9:49 PM  Attending Physician: Reny att. providers found   Discharging Provider: Onofre Lowe MD   Primary Care Provider: Reny, Primary Doctor    HPI: No notes on file      IUFD at 32 weeks admitted for induction    * No surgery found *     Hospital Course:   No notes on file     Consults (From admission, onward)          Status Ordering Provider     Inpatient consult to Social Work/Case Management  Once        Provider:  (Not yet assigned)    Completed ONOFRE LOWE            Final Active Diagnoses:    Diagnosis Date Noted POA    PRINCIPAL PROBLEM:  32 weeks gestation of pregnancy [Z3A.32] 2023 Not Applicable      Problems Resolved During this Admission:        Significant Diagnostic Studies: N/A      Feeding Method: IUFD    Immunizations       None            Delivery:    Episiotomy: None   Lacerations: None   Repair suture: None   Repair # of packets:     Blood loss (ml):       Birth information:  YOB: 2024   Time of birth: 1:52 AM   Sex: female   Delivery type: Vaginal, Spontaneous   Gestational Age: 28w1d     Measurements    Weight: 1871 g  Weight (lbs): 4 lb 2 oz  Length: 43.2 cm  Length (in): 17"         Delivery Clinician: Delivery Providers    Delivering clinician: Onofre Lowe MD   Provider Role    Radha Dave RN Wiley, Tristan, RN Vige', GEORGINA Babin Valerie, RN              Additional  information:  Forceps:    Vacuum:    Breech:    Observed anomalies      Living?:     Apgars    Living status: Fetal Demise  Apgar Component Scores:  1 min.:  5 min.:  10 min.:  15 min.:  20 min.:    Skin color:  0  0  0  0  0    Heart rate:  0  0  0  0  0    Reflex irritability:  0  0  0  0  0    Muscle tone:  0  0  " 0  0  0    Respiratory effort:  0  0  0  0  0    Total:  0  0  0  0  0             Placenta: Delivered:       appearance  Pending Diagnostic Studies:       Procedure Component Value Units Date/Time    Specimen to Pathology Gynecology and Obstetrics [[id number]] Collected: 12/17/23 0411    Order Status: Sent Lab Status: In process Updated: 12/18/23 1036    Specimen: Tissue             Discharged Condition: Patient without complaints, ambulating, uterus firm,minimal bleeding,ready to go home    Disposition: Home or Self Care    Follow Up:   Follow-up Information       Eric Tate MD. Schedule an appointment as soon as possible for a visit in 6 week(s).    Specialty: Obstetrics and Gynecology  Contact information:  [address] [place] [address]  [phone]                           Patient Instructions:   No discharge procedures on file.  Medications:  Discharge Medication List as of 12/18/2023 10:25 AM        STOP taking these medications       prenatal vit103-iron fum-folic () 27 mg iron- 1 mg Tab Comments:   Reason for Stopping:         PROCTOSOL HC 2.5 % rectal cream Comments:   Reason for Stopping:         ALPRAZolam (XANAX) 0.5 MG tablet Comments:   Reason for Stopping:         dextroamphetamine-amphetamine (ADDERALL) 15 mg tablet Comments:   Reason for Stopping:         ibuprofen (ADVIL,MOTRIN) 600 MG tablet Comments:   Reason for Stopping:         norelgestromin-ethinyl estradiol (XULANE) 150-35 mcg/24 hr Comments:   Reason for Stopping:               Eric Tate MD  Obstetrics  [facility] - 2nd Floor Mother/Baby Unit

## 2023-12-20 ENCOUNTER — HOSPITAL ENCOUNTER (EMERGENCY)
Facility: HOSPITAL | Age: 23
Discharge: LEFT BEFORE DISCHARGE | End: 2023-12-21
Payer: MEDICAID

## 2023-12-20 VITALS
DIASTOLIC BLOOD PRESSURE: 88 MMHG | OXYGEN SATURATION: 100 % | RESPIRATION RATE: 19 BRPM | BODY MASS INDEX: 22.08 KG/M2 | TEMPERATURE: 98 F | HEART RATE: 90 BPM | HEIGHT: 62 IN | SYSTOLIC BLOOD PRESSURE: 131 MMHG | WEIGHT: 120 LBS

## 2023-12-20 DIAGNOSIS — R07.9 CHEST PAIN: ICD-10-CM

## 2023-12-20 LAB
ABO + RH BLD: NORMAL
ABO + RH BLD: NORMAL
ALBUMIN SERPL-MCNC: 2.9 G/DL (ref 3.5–5)
ALBUMIN/GLOB SERPL: 0.9 RATIO (ref 1.1–2)
ALP SERPL-CCNC: 92 UNIT/L (ref 40–150)
ALT SERPL-CCNC: 15 UNIT/L (ref 0–55)
AST SERPL-CCNC: 18 UNIT/L (ref 5–34)
BASOPHILS # BLD AUTO: 0.04 X10(3)/MCL
BASOPHILS NFR BLD AUTO: 0.5 %
BILIRUB SERPL-MCNC: 0.3 MG/DL
BLD PROD TYP BPU: NORMAL
BLD PROD TYP BPU: NORMAL
BLOOD UNIT EXPIRATION DATE: NORMAL
BLOOD UNIT EXPIRATION DATE: NORMAL
BLOOD UNIT TYPE CODE: 5100
BLOOD UNIT TYPE CODE: 5100
BNP BLD-MCNC: 57.1 PG/ML
BUN SERPL-MCNC: 5.5 MG/DL (ref 7–18.7)
CALCIUM SERPL-MCNC: 8.2 MG/DL (ref 8.4–10.2)
CHLORIDE SERPL-SCNC: 109 MMOL/L (ref 98–107)
CO2 SERPL-SCNC: 24 MMOL/L (ref 22–29)
CREAT SERPL-MCNC: 0.64 MG/DL (ref 0.55–1.02)
CROSSMATCH INTERPRETATION: NORMAL
CROSSMATCH INTERPRETATION: NORMAL
DISPENSE STATUS: NORMAL
DISPENSE STATUS: NORMAL
EOSINOPHIL # BLD AUTO: 0.25 X10(3)/MCL (ref 0–0.9)
EOSINOPHIL NFR BLD AUTO: 3.1 %
ERYTHROCYTE [DISTWIDTH] IN BLOOD BY AUTOMATED COUNT: 13.1 % (ref 11.5–17)
GFR SERPLBLD CREATININE-BSD FMLA CKD-EPI: >60 MLS/MIN/1.73/M2
GLOBULIN SER-MCNC: 3.1 GM/DL (ref 2.4–3.5)
GLUCOSE SERPL-MCNC: 95 MG/DL (ref 74–100)
HCT VFR BLD AUTO: 30.4 % (ref 37–47)
HGB BLD-MCNC: 10.5 G/DL (ref 12–16)
IMM GRANULOCYTES # BLD AUTO: 0.03 X10(3)/MCL (ref 0–0.04)
IMM GRANULOCYTES NFR BLD AUTO: 0.4 %
INR PPP: 0.9
LYMPHOCYTES # BLD AUTO: 2.68 X10(3)/MCL (ref 0.6–4.6)
LYMPHOCYTES NFR BLD AUTO: 33.3 %
MCH RBC QN AUTO: 33 PG (ref 27–31)
MCHC RBC AUTO-ENTMCNC: 34.5 G/DL (ref 33–36)
MCV RBC AUTO: 95.6 FL (ref 80–94)
MONOCYTES # BLD AUTO: 0.54 X10(3)/MCL (ref 0.1–1.3)
MONOCYTES NFR BLD AUTO: 6.7 %
NEUTROPHILS # BLD AUTO: 4.52 X10(3)/MCL (ref 2.1–9.2)
NEUTROPHILS NFR BLD AUTO: 56 %
NRBC BLD AUTO-RTO: 0 %
PLATELET # BLD AUTO: 290 X10(3)/MCL (ref 130–400)
PMV BLD AUTO: 9.6 FL (ref 7.4–10.4)
POTASSIUM SERPL-SCNC: 3.2 MMOL/L (ref 3.5–5.1)
PROT SERPL-MCNC: 6 GM/DL (ref 6.4–8.3)
PROTHROMBIN TIME: 12.1 SECONDS (ref 12.5–14.5)
RBC # BLD AUTO: 3.18 X10(6)/MCL (ref 4.2–5.4)
SODIUM SERPL-SCNC: 141 MMOL/L (ref 136–145)
TROPONIN I SERPL-MCNC: <0.01 NG/ML (ref 0–0.04)
UNIT NUMBER: NORMAL
UNIT NUMBER: NORMAL
WBC # SPEC AUTO: 8.06 X10(3)/MCL (ref 4.5–11.5)

## 2023-12-20 PROCEDURE — 93005 ELECTROCARDIOGRAM TRACING: CPT

## 2023-12-20 PROCEDURE — 99900041 HC LEFT WITHOUT BEING SEEN- EMERGENCY

## 2023-12-20 PROCEDURE — 83880 ASSAY OF NATRIURETIC PEPTIDE: CPT | Performed by: EMERGENCY MEDICINE

## 2023-12-20 PROCEDURE — 80053 COMPREHEN METABOLIC PANEL: CPT | Performed by: EMERGENCY MEDICINE

## 2023-12-20 PROCEDURE — 85610 PROTHROMBIN TIME: CPT | Performed by: EMERGENCY MEDICINE

## 2023-12-20 PROCEDURE — 85025 COMPLETE CBC W/AUTO DIFF WBC: CPT | Performed by: EMERGENCY MEDICINE

## 2023-12-20 PROCEDURE — 84484 ASSAY OF TROPONIN QUANT: CPT | Performed by: EMERGENCY MEDICINE

## 2023-12-25 ENCOUNTER — HOSPITAL ENCOUNTER (EMERGENCY)
Facility: HOSPITAL | Age: 23
Discharge: HOME | End: 2023-12-25
Attending: STUDENT IN AN ORGANIZED HEALTH CARE EDUCATION/TRAINING PROGRAM
Payer: MEDICAID

## 2023-12-25 VITALS
SYSTOLIC BLOOD PRESSURE: 119 MMHG | OXYGEN SATURATION: 99 % | RESPIRATION RATE: 20 BRPM | HEART RATE: 90 BPM | WEIGHT: 115 LBS | TEMPERATURE: 99 F | BODY MASS INDEX: 21.03 KG/M2 | DIASTOLIC BLOOD PRESSURE: 72 MMHG

## 2023-12-25 DIAGNOSIS — N39.0 URINARY TRACT INFECTION WITHOUT HEMATURIA, SITE UNSPECIFIED: ICD-10-CM

## 2023-12-25 DIAGNOSIS — R00.2 PALPITATIONS: ICD-10-CM

## 2023-12-25 DIAGNOSIS — J10.1 INFLUENZA A: Primary | ICD-10-CM

## 2023-12-25 DIAGNOSIS — R07.9 CHEST PAIN: ICD-10-CM

## 2023-12-25 LAB
ALBUMIN SERPL-MCNC: 3.3 G/DL (ref 3.5–5)
ALBUMIN/GLOB SERPL: 1 RATIO (ref 1.1–2)
ALP SERPL-CCNC: 88 UNIT/L (ref 40–150)
ALT SERPL-CCNC: 13 UNIT/L (ref 0–55)
APPEARANCE UR: ABNORMAL
AST SERPL-CCNC: 12 UNIT/L (ref 5–34)
BACTERIA #/AREA URNS AUTO: ABNORMAL /HPF
BASOPHILS # BLD AUTO: 0.03 X10(3)/MCL
BASOPHILS NFR BLD AUTO: 0.4 %
BILIRUB SERPL-MCNC: 0.3 MG/DL
BILIRUB UR QL STRIP.AUTO: NEGATIVE
BNP BLD-MCNC: 17.2 PG/ML
BUN SERPL-MCNC: 4.5 MG/DL (ref 7–18.7)
CALCIUM SERPL-MCNC: 8.5 MG/DL (ref 8.4–10.2)
CHLORIDE SERPL-SCNC: 105 MMOL/L (ref 98–107)
CO2 SERPL-SCNC: 22 MMOL/L (ref 22–29)
COLOR UR AUTO: ABNORMAL
CREAT SERPL-MCNC: 0.66 MG/DL (ref 0.55–1.02)
EOSINOPHIL # BLD AUTO: 0.07 X10(3)/MCL (ref 0–0.9)
EOSINOPHIL NFR BLD AUTO: 1 %
ERYTHROCYTE [DISTWIDTH] IN BLOOD BY AUTOMATED COUNT: 12.7 % (ref 11.5–17)
FLUAV AG UPPER RESP QL IA.RAPID: DETECTED
FLUBV AG UPPER RESP QL IA.RAPID: NOT DETECTED
GFR SERPLBLD CREATININE-BSD FMLA CKD-EPI: >60 MLS/MIN/1.73/M2
GLOBULIN SER-MCNC: 3.2 GM/DL (ref 2.4–3.5)
GLUCOSE SERPL-MCNC: 97 MG/DL (ref 74–100)
GLUCOSE UR QL STRIP.AUTO: NORMAL
HCT VFR BLD AUTO: 34.9 % (ref 37–47)
HGB BLD-MCNC: 11.7 G/DL (ref 12–16)
IMM GRANULOCYTES # BLD AUTO: 0.03 X10(3)/MCL (ref 0–0.04)
IMM GRANULOCYTES NFR BLD AUTO: 0.4 %
INR PPP: 1
KETONES UR QL STRIP.AUTO: NEGATIVE
LEUKOCYTE ESTERASE UR QL STRIP.AUTO: 500
LYMPHOCYTES # BLD AUTO: 0.87 X10(3)/MCL (ref 0.6–4.6)
LYMPHOCYTES NFR BLD AUTO: 13 %
MCH RBC QN AUTO: 31.9 PG (ref 27–31)
MCHC RBC AUTO-ENTMCNC: 33.5 G/DL (ref 33–36)
MCV RBC AUTO: 95.1 FL (ref 80–94)
MONOCYTES # BLD AUTO: 0.47 X10(3)/MCL (ref 0.1–1.3)
MONOCYTES NFR BLD AUTO: 7 %
MUCOUS THREADS URNS QL MICRO: ABNORMAL /LPF
NEUTROPHILS # BLD AUTO: 5.2 X10(3)/MCL (ref 2.1–9.2)
NEUTROPHILS NFR BLD AUTO: 78.2 %
NITRITE UR QL STRIP.AUTO: NEGATIVE
NRBC BLD AUTO-RTO: 0 %
PH UR STRIP.AUTO: 6.5 [PH]
PLATELET # BLD AUTO: 360 X10(3)/MCL (ref 130–400)
PMV BLD AUTO: 9.5 FL (ref 7.4–10.4)
POTASSIUM SERPL-SCNC: 3.6 MMOL/L (ref 3.5–5.1)
PROT SERPL-MCNC: 6.5 GM/DL (ref 6.4–8.3)
PROT UR QL STRIP.AUTO: NEGATIVE
PROTHROMBIN TIME: 12.9 SECONDS (ref 12.5–14.5)
RBC # BLD AUTO: 3.67 X10(6)/MCL (ref 4.2–5.4)
RBC #/AREA URNS AUTO: ABNORMAL /HPF
RBC UR QL AUTO: ABNORMAL
SARS-COV-2 RNA RESP QL NAA+PROBE: NOT DETECTED
SODIUM SERPL-SCNC: 139 MMOL/L (ref 136–145)
SP GR UR STRIP.AUTO: 1.01 (ref 1–1.03)
SQUAMOUS #/AREA URNS LPF: ABNORMAL /HPF
TROPONIN I SERPL-MCNC: <0.01 NG/ML (ref 0–0.04)
UROBILINOGEN UR STRIP-ACNC: NORMAL
WBC # SPEC AUTO: 6.67 X10(3)/MCL (ref 4.5–11.5)
WBC #/AREA URNS AUTO: ABNORMAL /HPF

## 2023-12-25 PROCEDURE — 93010 ELECTROCARDIOGRAM REPORT: CPT | Mod: ,,, | Performed by: INTERNAL MEDICINE

## 2023-12-25 PROCEDURE — 25000003 PHARM REV CODE 250: Performed by: STUDENT IN AN ORGANIZED HEALTH CARE EDUCATION/TRAINING PROGRAM

## 2023-12-25 PROCEDURE — 25500020 PHARM REV CODE 255: Performed by: STUDENT IN AN ORGANIZED HEALTH CARE EDUCATION/TRAINING PROGRAM

## 2023-12-25 PROCEDURE — 80053 COMPREHEN METABOLIC PANEL: CPT | Performed by: STUDENT IN AN ORGANIZED HEALTH CARE EDUCATION/TRAINING PROGRAM

## 2023-12-25 PROCEDURE — 85610 PROTHROMBIN TIME: CPT | Performed by: STUDENT IN AN ORGANIZED HEALTH CARE EDUCATION/TRAINING PROGRAM

## 2023-12-25 PROCEDURE — 99285 EMERGENCY DEPT VISIT HI MDM: CPT | Mod: 25

## 2023-12-25 PROCEDURE — 83880 ASSAY OF NATRIURETIC PEPTIDE: CPT | Performed by: STUDENT IN AN ORGANIZED HEALTH CARE EDUCATION/TRAINING PROGRAM

## 2023-12-25 PROCEDURE — 0240U COVID/FLU A&B PCR: CPT | Performed by: STUDENT IN AN ORGANIZED HEALTH CARE EDUCATION/TRAINING PROGRAM

## 2023-12-25 PROCEDURE — 84484 ASSAY OF TROPONIN QUANT: CPT | Performed by: STUDENT IN AN ORGANIZED HEALTH CARE EDUCATION/TRAINING PROGRAM

## 2023-12-25 PROCEDURE — 81001 URINALYSIS AUTO W/SCOPE: CPT | Performed by: STUDENT IN AN ORGANIZED HEALTH CARE EDUCATION/TRAINING PROGRAM

## 2023-12-25 PROCEDURE — 87086 URINE CULTURE/COLONY COUNT: CPT | Performed by: STUDENT IN AN ORGANIZED HEALTH CARE EDUCATION/TRAINING PROGRAM

## 2023-12-25 PROCEDURE — 85025 COMPLETE CBC W/AUTO DIFF WBC: CPT | Performed by: STUDENT IN AN ORGANIZED HEALTH CARE EDUCATION/TRAINING PROGRAM

## 2023-12-25 PROCEDURE — 93005 ELECTROCARDIOGRAM TRACING: CPT

## 2023-12-25 RX ORDER — DOXYLAMINE SUCCINATE 25 MG/1
25 TABLET ORAL NIGHTLY PRN
Qty: 30 TABLET | Refills: 0 | Status: SHIPPED | OUTPATIENT
Start: 2023-12-25 | End: 2024-01-24

## 2023-12-25 RX ORDER — CEPHALEXIN 500 MG/1
500 CAPSULE ORAL EVERY 12 HOURS
Qty: 10 CAPSULE | Refills: 0 | Status: SHIPPED | OUTPATIENT
Start: 2023-12-25 | End: 2023-12-30

## 2023-12-25 RX ORDER — HYDROXYZINE PAMOATE 25 MG/1
25 CAPSULE ORAL EVERY 8 HOURS PRN
Qty: 15 CAPSULE | Refills: 0 | Status: SHIPPED | OUTPATIENT
Start: 2023-12-25 | End: 2023-12-30

## 2023-12-25 RX ORDER — FLUCONAZOLE 150 MG/1
150 TABLET ORAL DAILY
Qty: 1 TABLET | Refills: 0 | Status: SHIPPED | OUTPATIENT
Start: 2023-12-25 | End: 2023-12-26

## 2023-12-25 RX ORDER — ACETAMINOPHEN 500 MG
1000 TABLET ORAL
Status: COMPLETED | OUTPATIENT
Start: 2023-12-25 | End: 2023-12-25

## 2023-12-25 RX ORDER — CEPHALEXIN 500 MG/1
500 CAPSULE ORAL EVERY 12 HOURS
Qty: 10 CAPSULE | Refills: 0 | Status: SHIPPED | OUTPATIENT
Start: 2023-12-25 | End: 2023-12-25

## 2023-12-25 RX ORDER — OSELTAMIVIR PHOSPHATE 75 MG/1
75 CAPSULE ORAL 2 TIMES DAILY
Qty: 10 CAPSULE | Refills: 0 | Status: SHIPPED | OUTPATIENT
Start: 2023-12-25 | End: 2023-12-30

## 2023-12-25 RX ADMIN — ACETAMINOPHEN 1000 MG: 500 TABLET ORAL at 10:12

## 2023-12-25 RX ADMIN — IOPAMIDOL 100 ML: 755 INJECTION, SOLUTION INTRAVENOUS at 02:12

## 2023-12-25 NOTE — ED PROVIDER NOTES
Encounter Date: 12/25/2023    JANETTE Saldana MDM:  Please see ED course for additional information/MDM.    I reviewed the NP/PA documentation and agree with the NP/PA assessment and plan of care.  I had face-to-face time with the patient.  I have independently evaluated the patient, performed physical exam, and reviewed diagnostic tests.  I performed a substantive portion of this patient's ED visit and MDM. I have reviewed and discussed the assessment, plan, and pertinent lab/radiology results with both YARA and patient.     HPI: Viviane is a 23-year-old female with recent intrauterine fetal demise presents to the emergency department for chest pain and palpitations, fever.  Patient states she recently lost her baby at 32 weeks.  She has been having palpitations chest pain since.  States vaginal bleeding is improving.  Denies any new or worsening abdominal pain.      Physical exam:   GEN: Awake, alert, no distress  HEENT: NC/AT  CV: RRR, no murmurs   PULM: Lungs CTAB, normal work of breathing  ABD: Abd soft, NTND  NEURO: AOx4, no focal neurologic deficits    MDM:  Judging by the patient's chief complaint and pertinent history, the patient has the following possible differential diagnoses, including but not limited to the following.  Some of these are deemed to be lower likelihood and some more likely based on my physical exam and history combined with possible lab work and/or imaging studies.   Please see the pertinent studies, and refer to the HPI.  Some of these diagnoses will take further evaluation to fully rule out, perhaps as an outpatient and the patient was encouraged to follow up when discharged for more comprehensive evaluation.    Viral syndrome, COVID, flu, otitis media UTI, intraabdominal infection, bacterial pharyngitis, pneumonia, sepsis, pyelonephritis,     ED management  Patient is a 23-year-old female presents to emergency department for fever,.  On arrival she was tachycardic and febrile.  She was given  medications with defervescence of her fever.  Swabs were positive for influenza which is likely the etiology of her symptoms.  She also has some evidence of infection urine will treat.  No suprapubic abdominal pain.  Chest pain, given her recent IUFD, unable to PERC or risk by well's.  CTA obtained without any evidence of PE. Reassessed patient.  Patient is resting comfortably.  Discussed all results.  Discussed need for follow-up.  Discussed return precautions.  Answered all questions at this time.  Hemodynamically stable for continued outpatient management with strict return precautions.  Patient and family verbalized understanding agreed to plan.        Vitals:    12/25/23 1029 12/25/23 1128 12/25/23 1240 12/25/23 1515   BP: 108/73   119/72   Pulse: (!) 125   90   Resp: 20   20   Temp: (!) 100.7 °F (38.2 °C) (!) 101.4 °F (38.6 °C) 99.5 °F (37.5 °C) 99.3 °F (37.4 °C)   TempSrc: Oral      SpO2: 100%   99%   Weight: 52.2 kg (115 lb)        Labs Reviewed   COMPREHENSIVE METABOLIC PANEL - Abnormal; Notable for the following components:       Result Value    Blood Urea Nitrogen 4.5 (*)     Albumin Level 3.3 (*)     Albumin/Globulin Ratio 1.0 (*)     All other components within normal limits   CBC WITH DIFFERENTIAL - Abnormal; Notable for the following components:    RBC 3.67 (*)     Hgb 11.7 (*)     Hct 34.9 (*)     MCV 95.1 (*)     MCH 31.9 (*)     All other components within normal limits   URINALYSIS, REFLEX TO URINE CULTURE - Abnormal; Notable for the following components:    Appearance, UA Turbid (*)     Blood, UA 3+ (*)     Leukocyte Esterase,  (*)     WBC, UA 51-99 (*)     Mucous, UA Trace (*)     All other components within normal limits   COVID/FLU A&B PCR - Abnormal; Notable for the following components:    Influenza A PCR Detected (*)     All other components within normal limits    Narrative:     The Xpert Xpress SARS-CoV-2/FLU/RSV plus is a rapid, multiplexed real-time PCR test intended for the  simultaneous qualitative detection and differentiation of SARS-CoV-2, Influenza A, Influenza B, and respiratory syncytial virus (RSV) viral RNA in either nasopharyngeal swab or nasal swab specimens.         B-TYPE NATRIURETIC PEPTIDE - Normal   TROPONIN I - Normal   PROTIME-INR - Normal   CULTURE, URINE   CBC W/ AUTO DIFFERENTIAL    Narrative:     The following orders were created for panel order CBC auto differential.  Procedure                               Abnormality         Status                     ---------                               -----------         ------                     CBC with Differential[[id number]]       Abnormal            Final result                 Please view results for these tests on the individual orders.     Imaging Results              CTA Chest Non-Coronary (PE Studies) (Final result)  Result time 12/25/23 14:25:07      Final result by Gwen Arcos MD (12/25/23 14:25:07)                   Impression:      No pulmonary embolism identified.      Electronically signed by: Gwen Arcos  Date:    12/25/2023  Time:    14:25               Narrative:    EXAMINATION:  CTA CHEST NON CORONARY (PE STUDIES)    CLINICAL HISTORY:  Pulmonary embolism (PE) suspected, high prob;    TECHNIQUE:  Helical-acquisition CT images were obtained prior to and following the intravenous administration of iodine-based contrast media.    The post-contrast images were timed to coincide with arterial opacification for purpose of CT angiography.    Multiplanar reconstructions, to include MIP and volume-rendered (3D) images, were accomplished by the CT technologist at a separate workstation and pushed to PACS for physician review.    Total DLP (mGy-cm) 349 (value may include radiation due to concomitantly performed CT imaging)    Automated tube current modulation and/or weight-based exposure dosing is utilized, when appropriate, to reach lowest reasonably achievable exposure  rate.    COMPARISON:  None    FINDINGS:  The heart is normal in size.  The RV to LV ratio is less than 1.  There is no pericardial effusion.  There is no pulmonary embolism identified.    The lungs are clear.  There is no pleural effusion or pneumothorax.    There are no acute findings in the upper abdomen.    Mid there is no acute bony abnormality.  There is scoliosis of the thoracic spine.                                       X-Ray Chest PA And Lateral (Final result)  Result time 12/25/23 11:01:35      Final result by Tre Blevins MD (12/25/23 11:01:35)                   Narrative:    EXAMINATION  XR CHEST PA AND LATERAL    CLINICAL HISTORY  Chest Pain;    TECHNIQUE  A total of 2 images submitted of the chest.    COMPARISON  20 December 2023    FINDINGS  Lines/tubes/devices: none visualized    The cardiac silhouette and central vascular structures are unchanged.  The trachea is midline. No new or worsening consolidation is identified. There is no enlarging pleural effusion or convincing pneumothorax.    Regional osseous structures and extrathoracic soft tissues are similar.    IMPRESSION  No acute cardiopulmonary process or other adverse interval change.      Electronically signed by: Tre Blevins  Date:    12/25/2023  Time:    11:01                                  Medications   acetaminophen tablet 1,000 mg (1,000 mg Oral Given 12/25/23 1049)   iopamidoL (ISOVUE-370) injection 100 mL (100 mLs Intravenous Given 12/25/23 1409)       Chris Saldana MD           History     Chief Complaint   Patient presents with    Chest Pain     Chest pain that started last night with congestion and body aches/chills. Reports feeling like her heart is beating out her chest at night. Denies any cardiac hx.      See MDM    The history is provided by the patient.     Review of patient's allergies indicates:  No Known Allergies  Past Medical History:   Diagnosis Date    Anxiety state 2/17/2021 1:28:10 PM    [facility]  - [facility]: Anxiety-No Additional Notes    Anxiety state 2/17/2021 1:28:10 PM    [facility] - [facility]: Anxiety-No Additional Notes    Depression     Mental disorder     Anxiety and Depression    Other depressive disorder 2/17/2021 1:28:03 PM    [facility]: Depression-No Additional Notes    Other depressive disorder 2/17/2021 1:28:03 PM    [facility]: Depression-No Additional Notes     Past Surgical History:   Procedure Laterality Date    HAND SURGERY      TONSILLECTOMY      WISDOM TOOTH EXTRACTION       Family History   Problem Relation Age of Onset    Breast cancer Maternal Grandmother     Hypertension Mother      Social History     Tobacco Use    Smoking status: Never    Smokeless tobacco: Never   Substance Use Topics    Alcohol use: Not Currently    Drug use: Never     Review of Systems   Constitutional:  Negative for fever.   HENT:  Positive for congestion.    Respiratory:  Positive for cough. Negative for shortness of breath.    Cardiovascular:  Positive for chest pain and palpitations.   Gastrointestinal:  Negative for abdominal pain.   Genitourinary:  Negative for difficulty urinating and dysuria.   Musculoskeletal:  Positive for myalgias. Negative for gait problem.   Skin:  Negative for color change.   Neurological:  Negative for dizziness, speech difficulty and headaches.   Psychiatric/Behavioral:  Negative for hallucinations and suicidal ideas.    All other systems reviewed and are negative.      Physical Exam     Initial Vitals [12/25/23 1029]   BP Pulse Resp Temp SpO2   108/73 (!) 125 20 (!) 100.7 °F (38.2 °C) 100 %      MAP       --         Physical Exam    Nursing note and vitals reviewed.  Constitutional: She appears well-developed and well-nourished.   HENT:   Head: Normocephalic.   Eyes: EOM are normal.   Neck:   Normal range of motion.  Cardiovascular:  Normal rate, regular rhythm, normal heart sounds and intact distal pulses.           Pulmonary/Chest:  Breath sounds normal. No respiratory distress.   Abdominal: Abdomen is soft. Bowel sounds are normal. There is no abdominal tenderness.   Musculoskeletal:         General: Normal range of motion.      Cervical back: Normal range of motion.     Neurological: She is alert and oriented to person, place, and time. She has normal strength.   Skin: Skin is warm and dry.   Psychiatric: She has a normal mood and affect. Her behavior is normal. Judgment and thought content normal.         ED Course   Procedures  Labs Reviewed   COMPREHENSIVE METABOLIC PANEL - Abnormal; Notable for the following components:       Result Value    Blood Urea Nitrogen 4.5 (*)     Albumin Level 3.3 (*)     Albumin/Globulin Ratio 1.0 (*)     All other components within normal limits   CBC WITH DIFFERENTIAL - Abnormal; Notable for the following components:    RBC 3.67 (*)     Hgb 11.7 (*)     Hct 34.9 (*)     MCV 95.1 (*)     MCH 31.9 (*)     All other components within normal limits   URINALYSIS, REFLEX TO URINE CULTURE - Abnormal; Notable for the following components:    Appearance, UA Turbid (*)     Blood, UA 3+ (*)     Leukocyte Esterase,  (*)     WBC, UA 51-99 (*)     Mucous, UA Trace (*)     All other components within normal limits   COVID/FLU A&B PCR - Abnormal; Notable for the following components:    Influenza A PCR Detected (*)     All other components within normal limits    Narrative:     The Xpert Xpress SARS-CoV-2/FLU/RSV plus is a rapid, multiplexed real-time PCR test intended for the simultaneous qualitative detection and differentiation of SARS-CoV-2, Influenza A, Influenza B, and respiratory syncytial virus (RSV) viral RNA in either nasopharyngeal swab or nasal swab specimens.         B-TYPE NATRIURETIC PEPTIDE - Normal   TROPONIN I - Normal   PROTIME-INR - Normal   CULTURE, URINE   CBC W/ AUTO DIFFERENTIAL    Narrative:     The following orders were created for panel order CBC auto differential.  Procedure                                Abnormality         Status                     ---------                               -----------         ------                     CBC with Differential[[id number]]       Abnormal            Final result                 Please view results for these tests on the individual orders.     EKG Readings: (Independently Interpreted)   Rhythm: Normal Sinus Rhythm. Heart Rate: 115. Ectopy: No Ectopy. Conduction: Normal. ST Segments: Normal ST Segments. T Waves: Normal. Axis: Normal. Clinical Impression: Normal Sinus Rhythm       Imaging Results              X-Ray Chest PA And Lateral (Final result)  Result time 12/25/23 11:01:35      Final result by Tre Blevins MD (12/25/23 11:01:35)                   Narrative:    EXAMINATION  XR CHEST PA AND LATERAL    CLINICAL HISTORY  Chest Pain;    TECHNIQUE  A total of 2 images submitted of the chest.    COMPARISON  20 December 2023    FINDINGS  Lines/tubes/devices: none visualized    The cardiac silhouette and central vascular structures are unchanged.  The trachea is midline. No new or worsening consolidation is identified. There is no enlarging pleural effusion or convincing pneumothorax.    Regional osseous structures and extrathoracic soft tissues are similar.    IMPRESSION  No acute cardiopulmonary process or other adverse interval change.      Electronically signed by: Tre Blevins  Date:    12/25/2023  Time:    11:01                                  X-Rays:   Independently Interpreted Readings:   Chest X-Ray: Normal heart size.  No infiltrates.  No acute abnormalities.     Medications   acetaminophen tablet 1,000 mg (1,000 mg Oral Given 12/25/23 1049)     Medical Decision Making  Historian:  Patient.  Patient is a 23-year-old female  that presents with coughing congestion that has been present yesterday. Associated symptoms palpitations a few weeks intermittent. Surrounding information is nothing. Exacerbated by nothing. Relieved by nothing. Patient  treatment prior to arrival none. Risk factors include none. Other history pertaining to this complaint nothing.   Assessment:  See physical exam.  DD:  COVID, flu, upper respiratory infection, pots, sleep apnea      Problems Addressed:  Chest pain: acute illness or injury that poses a threat to life or bodily functions  Influenza A: acute illness or injury that poses a threat to life or bodily functions  Palpitations: acute illness or injury that poses a threat to life or bodily functions  Urinary tract infection without hematuria, site unspecified: acute illness or injury that poses a threat to life or bodily functions    Amount and/or Complexity of Data Reviewed  External Data Reviewed: ECG and notes.  Labs: ordered.  Radiology: ordered and independent interpretation performed.  ECG/medicine tests: ordered and independent interpretation performed.  Discussion of management or test interpretation with external provider(s): History is obtained.  Physical was performed.  Patient appears to have a UTI and also the flu.  I will arrange for Holter monitor for the palpitations.  I arrange for follow up with [facility].  No medical or surgical consult indicated in the ER.  No social determinants that affect healthcare were noted.  CT PE protocol per Dr. Saldana negative for acute findings    Risk  OTC drugs.  Prescription drug management.  Parenteral controlled substances.                          Medical Decision Making:   History:   I obtained history from: someone other than patient.       <> Summary of History: Collateral history from father  Old Medical Records: I decided to obtain old medical records.  Old Records Summarized: records from clinic visits, records from another hospital and records from previous admission(s).       <> Summary of Records: Reviewed old records             Clinical Impression:  Final diagnoses:  [R07.9] Chest pain  [J10.1] Influenza A (Primary)  [N39.0] Urinary  tract infection without hematuria, site unspecified  [R00.2] Palpitations          ED Disposition Condition    Discharge Stable          ED Prescriptions       Medication Sig Dispense Start Date End Date Auth. Provider    oseltamivir (TAMIFLU) 75 MG capsule Take 1 capsule (75 mg total) by mouth 2 (two) times daily. for 5 days 10 capsule 12/25/2023 12/30/2023 Davy Tellez FNP    cephALEXin (KEFLEX) 500 MG capsule Take 1 capsule (500 mg total) by mouth every 12 (twelve) hours. for 5 days 10 capsule 12/25/2023 12/30/2023 Chris Saldana MD          Follow-up Information       Follow up With Specialties Details Why Contact Info    Your Primary Care Provider  Call in 3 days ed follow up              Davy Tellez FNP  12/25/23 1311       Davy Tellez FNP  12/25/23 1444       Chris Saldana MD  12/31/23 [id number]

## 2023-12-25 NOTE — DISCHARGE INSTRUCTIONS
Follow-up with the primary care physician.      Take Tamiflu as prescribed.      Take antibiotic as prescribed for urinary tract infection.      Return to the emergency department if you have any new or worsening symptoms, chest pain, difficulty breathing, fever, leg swelling, or any other concerns.

## 2023-12-27 LAB — BACTERIA UR CULT: NORMAL

## 2024-03-12 ENCOUNTER — LAB VISIT (OUTPATIENT)
Dept: LAB | Facility: HOSPITAL | Age: 24
End: 2024-03-12
Attending: OBSTETRICS & GYNECOLOGY
Payer: MEDICAID

## 2024-03-12 DIAGNOSIS — O02.1 MISSED ABORTION: Primary | ICD-10-CM

## 2024-03-12 LAB — TT IMM BOVINE THROMBIN PPP: 17 SECONDS (ref 0–22)

## 2024-03-12 PROCEDURE — 85613 RUSSELL VIPER VENOM DILUTED: CPT

## 2024-03-12 PROCEDURE — 36415 COLL VENOUS BLD VENIPUNCTURE: CPT

## 2024-03-12 PROCEDURE — 85670 THROMBIN TIME PLASMA: CPT

## 2024-03-19 LAB
DRVV CONF RATIO (OHS): 1.05
DRVV NORM RATIO (OHS): 0.95 (ref 0–1.19)
DRVV SCR RATIO (OHS): 1
L.A. PATH INTERP (OHS): NORMAL
LA ST CALC (OHS): 4.6 SECONDS (ref 0–7.9)

## 2024-05-01 ENCOUNTER — APPOINTMENT (OUTPATIENT)
Dept: LAB | Facility: HOSPITAL | Age: 24
End: 2024-05-01
Attending: DERMATOLOGY
Payer: MEDICAID

## 2024-05-01 DIAGNOSIS — Z79.899 ENCOUNTER FOR LONG-TERM (CURRENT) USE OF OTHER MEDICATIONS: Primary | ICD-10-CM

## 2024-05-01 DIAGNOSIS — L65.0 TELOGEN EFFLUVIUM: ICD-10-CM

## 2024-05-01 LAB
ALBUMIN SERPL-MCNC: 3.9 G/DL (ref 3.5–5)
ALBUMIN/GLOB SERPL: 1.3 RATIO (ref 1.1–2)
ALP SERPL-CCNC: 43 UNIT/L (ref 40–150)
ALT SERPL-CCNC: 9 UNIT/L (ref 0–55)
AST SERPL-CCNC: 12 UNIT/L (ref 5–34)
BILIRUB SERPL-MCNC: 0.4 MG/DL
BUN SERPL-MCNC: 10.9 MG/DL (ref 7–18.7)
CALCIUM SERPL-MCNC: 8.6 MG/DL (ref 8.4–10.2)
CHLORIDE SERPL-SCNC: 108 MMOL/L (ref 98–107)
CO2 SERPL-SCNC: 24 MMOL/L (ref 22–29)
CREAT SERPL-MCNC: 0.68 MG/DL (ref 0.55–1.02)
ERYTHROCYTE [DISTWIDTH] IN BLOOD BY AUTOMATED COUNT: 13.6 % (ref 11.5–17)
GFR SERPLBLD CREATININE-BSD FMLA CKD-EPI: >60 MLS/MIN/1.73/M2
GLOBULIN SER-MCNC: 3 GM/DL (ref 2.4–3.5)
GLUCOSE SERPL-MCNC: 80 MG/DL (ref 74–100)
HCT VFR BLD AUTO: 35.7 % (ref 37–47)
HGB BLD-MCNC: 11.4 G/DL (ref 12–16)
MCH RBC QN AUTO: 28.8 PG (ref 27–31)
MCHC RBC AUTO-ENTMCNC: 31.9 G/DL (ref 33–36)
MCV RBC AUTO: 90.2 FL (ref 80–94)
NRBC BLD AUTO-RTO: 0 %
PLATELET # BLD AUTO: 309 X10(3)/MCL (ref 130–400)
PMV BLD AUTO: 10.1 FL (ref 7.4–10.4)
POTASSIUM SERPL-SCNC: 4.2 MMOL/L (ref 3.5–5.1)
PROT SERPL-MCNC: 6.9 GM/DL (ref 6.4–8.3)
RBC # BLD AUTO: 3.96 X10(6)/MCL (ref 4.2–5.4)
SODIUM SERPL-SCNC: 139 MMOL/L (ref 136–145)
TSH SERPL-ACNC: 0.89 UIU/ML (ref 0.35–4.94)
WBC # SPEC AUTO: 5.01 X10(3)/MCL (ref 4.5–11.5)

## 2024-05-01 PROCEDURE — 36415 COLL VENOUS BLD VENIPUNCTURE: CPT

## 2024-05-01 PROCEDURE — 84443 ASSAY THYROID STIM HORMONE: CPT

## 2024-05-01 PROCEDURE — 80053 COMPREHEN METABOLIC PANEL: CPT

## 2024-05-01 PROCEDURE — 85027 COMPLETE CBC AUTOMATED: CPT

## 2024-05-09 ENCOUNTER — OFFICE VISIT (OUTPATIENT)
Dept: MATERNAL FETAL MEDICINE | Facility: CLINIC | Age: 24
End: 2024-05-09
Payer: MEDICAID

## 2024-05-09 VITALS
DIASTOLIC BLOOD PRESSURE: 97 MMHG | WEIGHT: 119.94 LBS | HEART RATE: 80 BPM | BODY MASS INDEX: 22.07 KG/M2 | SYSTOLIC BLOOD PRESSURE: 139 MMHG | HEIGHT: 62 IN

## 2024-05-09 DIAGNOSIS — Z87.59 HISTORY OF UNEXPLAINED STILLBIRTH: Primary | ICD-10-CM

## 2024-05-09 PROCEDURE — 3008F BODY MASS INDEX DOCD: CPT | Mod: CPTII,S$GLB,, | Performed by: OBSTETRICS & GYNECOLOGY

## 2024-05-09 PROCEDURE — 3080F DIAST BP >= 90 MM HG: CPT | Mod: CPTII,S$GLB,, | Performed by: OBSTETRICS & GYNECOLOGY

## 2024-05-09 PROCEDURE — 1159F MED LIST DOCD IN RCRD: CPT | Mod: CPTII,S$GLB,, | Performed by: OBSTETRICS & GYNECOLOGY

## 2024-05-09 PROCEDURE — 99204 OFFICE O/P NEW MOD 45 MIN: CPT | Mod: S$GLB,,, | Performed by: OBSTETRICS & GYNECOLOGY

## 2024-05-09 PROCEDURE — 1160F RVW MEDS BY RX/DR IN RCRD: CPT | Mod: CPTII,S$GLB,, | Performed by: OBSTETRICS & GYNECOLOGY

## 2024-05-09 PROCEDURE — 3075F SYST BP GE 130 - 139MM HG: CPT | Mod: CPTII,S$GLB,, | Performed by: OBSTETRICS & GYNECOLOGY

## 2024-05-09 RX ORDER — LEUPROLIDE ACETATE 1 MG/0.2ML
10 VIAL (ML) SUBCUTANEOUS NIGHTLY
COMMUNITY

## 2024-05-09 NOTE — ASSESSMENT & PLAN NOTE
I reviewed the patient's obstetric history which is remarkable for a previous stillbirth at 32 weeks gestation. Maternal medical history is significant for possible partial placental abruption. There is no significant family history. She was found to have lupus anticoagulant positivity x 1 in December. Repeat was negative and beta 2 glycoprotein and anticardiolipin was negative.   Work-up included:  Genetic testing not done  Antiphospholipid testing (APS):  LAC pos , repeat neg  B2 glycoprotein IgG/IgM neg  Anticardiolipin IgG/IgM neg  Kleihauer Betke test (or flow cytometry) for fetal-maternal hemorrhage: not done  Type and screen shows no antibodies and RPR neg  Urine toxicology neg  OB Glucose screening was normal  Placental pathology which showed no unusual findings  Autopsy was declined  I counseled the patient regarding the risk for recurrent stillbirth. The recurrence risk depends on the underlying etiology. However, if the cause of fetal death in a low-risk individual was not discovered, the risk of recurrence is estimated to be 7.8 to 10.5 per 1000 births.  The subsequent pregnancy is also at risk for abruption,  birth, and fetal growth restriction. The patient was counseled that despite a thorough work-up, the etiology for her stillbirth remains unexplained. I counseled the patient regarding management during a future pregnancy including the recommendation for serial growth ultrasounds and antepartum testing.    Recommendations:  Optimization of maternal medical conditions (she is healthy)  Initiate low dose aspirin daily (81mg) in future pregnancy  Ensure evaluation and work-up has been completed: (APS testing, T&S, RPR, and screening for maternal diabetes (A1C or 1 hour glucose screen if appropriate))  An inherited thrombophilia panel is not indicated in the work-up of stillbirth or recurrent pregnancy loss  Referral to genetics if concern for familial genetic condition  Refer to [facility] for  planned anticoagulation in future pregnancy- considering her poor OB outcome and +LAC, I would place on prophylactic lovenox.  Serial fetal growth ultrasounds every 4-6 weeks, starting at 26-28 weeks  Fetal movement awareness, starting at 27-28 weeks  Initiate twice weekly antepartum surveillance at 32 weeks (or 2 weeks prior to gestational age at time of prior stillbirth, but not < 28 weeks)  Delivery at 38 0/7 - 38 6/7 weeks gestation, unless indicated earlier by maternal or obstetric condition (e.g. diabetes, etc)  Close monitoring for peripartum mood disorders

## 2024-05-09 NOTE — PROGRESS NOTES
MATERNAL-FETAL MEDICINE   CONSULT NOTE    Provider requesting consultation: Dr. Eric Tate    SUBJECTIVE:     Ms. Viviane Garnica is a 24 y.o.  who is seen for preconception counseling by MFM for evaluation and management of:  Problem   History of Unexplained Stillbirth       Viviane arrives today for consultation due to previous intrauterine fetal demise at 32 weeks in December.  She states the pregnancy was overall going well when she did not feel movement and went in for assessment.  She had had an appointment 3 days prior to the fetal demise and everything had been fine. Fetal weight was typical for that age and she was told there was some placental abruption noted at time of delivery.  She says she was sick a lot in the pregnancy and when I asked for further details she said she had respiratory infections and had hand-foot-mouth from her other child.  She states that she received antibiotics after delivery due to foul-smelling odor but never had another issue after that.  She denies having any fevers prior to delivery but states that the heart rate may have been slightly high.  Showed knowledge is a green discharge occasionally in the pregnancy due to unclear cause without history of any STIs.    Medically she is doing well however, this has resulted in significant anxiety and depression and she is tearful today in our visit.  She is concerned that this could happen again and she has not sure if she can handle it.  She also feels like she needs to have another baby to be able to feel better.  She is currently undergoing IVF with Dr. Casiano and has an embryo transfer scheduled in .  She has not on any medications for mental health.  She states that Dr. Chadwick plans to put her on prophylactic Lovenox in a future pregnancy as she had some anticardiolipin positivity intermittently over the last year.  She saw a naturopathic physician who has placed her on several supplements and we reviewed  "these today. I have asked her to stop a few of them.        Medication List with Changes/Refills   Current Medications    LEUPROLIDE (LUPRON) 1 MG/0.2 ML INJECTION    10 mg nightly. Pt reports she takes 10u qhs       Review of patient's allergies indicates:  No Known Allergies    PMH:  Past Medical History:   Diagnosis Date    Anxiety state 2021 1:28:10 PM    [facility]: Anxiety-No Additional Notes    Other depressive disorder 2021 1:28:03 PM    [facility]: Depression-No Additional Notes       PObHx:  OB History    Para Term  AB Living   2 2 1 1   1   SAB IAB Ectopic Multiple Live Births         0 1      # Outcome Date GA Lbr Ward/2nd Weight Sex Type Anes PTL Lv   2  23 32w0d  1.871 kg (4 lb 2 oz) F Vag-Spont EPI Y FD      Complications: Abruptio Placenta   1 Term 21 37w6d  2.892 kg (6 lb 6 oz) F Vag-Spont EPI N        PSH:  Past Surgical History:   Procedure Laterality Date    HAND SURGERY      ROOT CANAL      TONSILLECTOMY      WISDOM TOOTH EXTRACTION         Family history:family history includes Breast cancer in her maternal grandmother; Hypertension in her mother.    Social history: reports that she has never smoked. She has never used smokeless tobacco. She reports current alcohol use. She reports that she does not use drugs.    Genetic history:  The patient denies any inherited genetic diseases or birth defects in herself or her partner's personal history or family.    Objective:   BP (!) 139/97 (BP Location: Right arm)   Pulse 80   Ht 5' 2" (1.575 m)   Wt 54.4 kg (119 lb 14.9 oz)   LMP 2024 (Exact Date)   BMI 21.94 kg/m²     Physical Exam  Constitutional:       General: She is not in acute distress.     Appearance: She is normal weight.   HENT:      Head: Normocephalic and atraumatic.      Nose: No congestion.   Eyes:      General:         Right eye: No discharge.         Left eye: No discharge.      Pupils: Pupils " are equal, round, and reactive to light.   Pulmonary:      Effort: Pulmonary effort is normal. No respiratory distress.      Breath sounds: No wheezing.   Abdominal:      Tenderness: There is no guarding or rebound.   Neurological:      General: No focal deficit present.      Mental Status: She is alert and oriented to person, place, and time. Mental status is at baseline.   Psychiatric:         Mood and Affect: Mood normal.         Behavior: Behavior normal.         Thought Content: Thought content normal.         Judgment: Judgment normal.       Significant labs/imaging:  Lupus anticoagulant +, repeat neg  Beta-2 glycoprotein, anticardiolipin neg    ASSESSMENT/PLAN:     24 y.o.  female here for preconception counseling.    History of unexplained stillbirth  I reviewed the patient's obstetric history which is remarkable for a previous stillbirth at 32 weeks gestation. Maternal medical history is significant for possible partial placental abruption. There is no significant family history. She was found to have lupus anticoagulant positivity x 1 in December. Repeat was negative and beta 2 glycoprotein and anticardiolipin was negative.   Work-up included:  Genetic testing not done  Antiphospholipid testing (APS):  LAC pos , repeat neg  B2 glycoprotein IgG/IgM neg  Anticardiolipin IgG/IgM neg  Kleihauer Betke test (or flow cytometry) for fetal-maternal hemorrhage: not done  Type and screen shows no antibodies and RPR neg  Urine toxicology neg  OB Glucose screening was normal  Placental pathology which showed no unusual findings  Autopsy was declined  I counseled the patient regarding the risk for recurrent stillbirth. The recurrence risk depends on the underlying etiology. However, if the cause of fetal death in a low-risk individual was not discovered, the risk of recurrence is estimated to be 7.8 to 10.5 per 1000 births.  The subsequent pregnancy is also at risk for abruption,  birth, and fetal  growth restriction. The patient was counseled that despite a thorough work-up, the etiology for her stillbirth remains unexplained. I counseled the patient regarding management during a future pregnancy including the recommendation for serial growth ultrasounds and antepartum testing.    Recommendations:  Optimization of maternal medical conditions (she is healthy)  Initiate low dose aspirin daily (81mg) in future pregnancy  Ensure evaluation and work-up has been completed: (APS testing, T&S, RPR, and screening for maternal diabetes (A1C or 1 hour glucose screen if appropriate))  An inherited thrombophilia panel is not indicated in the work-up of stillbirth or recurrent pregnancy loss  Referral to genetics if concern for familial genetic condition  Refer to [facility] for planned anticoagulation in future pregnancy- considering her poor OB outcome and +LAC, I would place on prophylactic lovenox.  Serial fetal growth ultrasounds every 4-6 weeks, starting at 26-28 weeks  Fetal movement awareness, starting at 27-28 weeks  Initiate twice weekly antepartum surveillance at 32 weeks (or 2 weeks prior to gestational age at time of prior stillbirth, but not < 28 weeks)  Delivery at 38 0/7 - 38 6/7 weeks gestation, unless indicated earlier by maternal or obstetric condition (e.g. diabetes, etc)  Close monitoring for peripartum mood disorders        FOLLOW UP:     45 minutes of total time spent on the encounter, which includes face to face time and non-face to face time preparing to see the patient (eg, review of tests), obtaining and/or reviewing separately obtained history, documenting clinical information in the electronic or other health record, independently interpreting results (not separately reported) and communicating results to the patient/family/caregiver, or care coordination (not separately reported).      Christine Sanchez  Maternal-Fetal Medicine    Electronically Signed by Christine Sanchez May 9, 2024

## 2024-06-09 ENCOUNTER — PATIENT MESSAGE (OUTPATIENT)
Dept: MATERNAL FETAL MEDICINE | Facility: CLINIC | Age: 24
End: 2024-06-09
Payer: MEDICAID

## 2024-06-11 ENCOUNTER — LAB VISIT (OUTPATIENT)
Dept: LAB | Facility: HOSPITAL | Age: 24
End: 2024-06-11
Attending: OBSTETRICS & GYNECOLOGY
Payer: MEDICAID

## 2024-06-11 DIAGNOSIS — N91.2 ABSENCE OF MENSTRUATION: Primary | ICD-10-CM

## 2024-06-11 LAB
B-HCG FREE SERPL-ACNC: 46.32 MIU/ML
PROGEST SERPL-MCNC: 20.1 NG/ML

## 2024-06-11 PROCEDURE — 84702 CHORIONIC GONADOTROPIN TEST: CPT

## 2024-06-11 PROCEDURE — 84144 ASSAY OF PROGESTERONE: CPT

## 2024-06-11 PROCEDURE — 36415 COLL VENOUS BLD VENIPUNCTURE: CPT

## 2024-06-13 ENCOUNTER — LAB VISIT (OUTPATIENT)
Dept: LAB | Facility: HOSPITAL | Age: 24
End: 2024-06-13
Attending: OBSTETRICS & GYNECOLOGY
Payer: MEDICAID

## 2024-06-13 DIAGNOSIS — N91.2 ABSENCE OF MENSTRUATION: Primary | ICD-10-CM

## 2024-06-13 LAB — B-HCG FREE SERPL-ACNC: 124.79 MIU/ML

## 2024-06-13 PROCEDURE — 84702 CHORIONIC GONADOTROPIN TEST: CPT

## 2024-06-13 PROCEDURE — 36415 COLL VENOUS BLD VENIPUNCTURE: CPT

## 2024-07-11 DIAGNOSIS — O46.8X1 SUBCHORIONIC HEMATOMA IN FIRST TRIMESTER, SINGLE OR UNSPECIFIED FETUS: ICD-10-CM

## 2024-07-11 DIAGNOSIS — O36.4XX0 IUFD AT 20 WEEKS OR MORE OF GESTATION: Primary | ICD-10-CM

## 2024-07-11 DIAGNOSIS — O41.8X10 SUBCHORIONIC HEMATOMA IN FIRST TRIMESTER, SINGLE OR UNSPECIFIED FETUS: ICD-10-CM

## 2024-07-16 ENCOUNTER — PROCEDURE VISIT (OUTPATIENT)
Dept: MATERNAL FETAL MEDICINE | Facility: CLINIC | Age: 24
End: 2024-07-16
Payer: MEDICAID

## 2024-07-16 ENCOUNTER — PATIENT MESSAGE (OUTPATIENT)
Dept: MATERNAL FETAL MEDICINE | Facility: CLINIC | Age: 24
End: 2024-07-16

## 2024-07-16 ENCOUNTER — OFFICE VISIT (OUTPATIENT)
Dept: MATERNAL FETAL MEDICINE | Facility: CLINIC | Age: 24
End: 2024-07-16
Payer: MEDICAID

## 2024-07-16 VITALS
HEART RATE: 75 BPM | HEIGHT: 62 IN | WEIGHT: 124.44 LBS | SYSTOLIC BLOOD PRESSURE: 118 MMHG | DIASTOLIC BLOOD PRESSURE: 78 MMHG | BODY MASS INDEX: 22.9 KG/M2

## 2024-07-16 DIAGNOSIS — O36.4XX0 IUFD AT 20 WEEKS OR MORE OF GESTATION: ICD-10-CM

## 2024-07-16 DIAGNOSIS — O99.340 ANXIETY ASSOCIATED WITH BIRTHING PROCESS: ICD-10-CM

## 2024-07-16 DIAGNOSIS — O99.111 LUPUS ANTICOAGULANT AFFECTING PREGNANCY IN FIRST TRIMESTER, ANTEPARTUM: ICD-10-CM

## 2024-07-16 DIAGNOSIS — O46.8X1 SUBCHORIONIC HEMATOMA IN FIRST TRIMESTER, SINGLE OR UNSPECIFIED FETUS: ICD-10-CM

## 2024-07-16 DIAGNOSIS — O41.8X10 SUBCHORIONIC HEMATOMA IN FIRST TRIMESTER, SINGLE OR UNSPECIFIED FETUS: ICD-10-CM

## 2024-07-16 DIAGNOSIS — F41.9 ANXIETY ASSOCIATED WITH BIRTHING PROCESS: ICD-10-CM

## 2024-07-16 DIAGNOSIS — D68.62 LUPUS ANTICOAGULANT AFFECTING PREGNANCY IN FIRST TRIMESTER, ANTEPARTUM: ICD-10-CM

## 2024-07-16 DIAGNOSIS — D68.61 ANTIPHOSPHOLIPID ANTIBODY SYNDROME: Primary | ICD-10-CM

## 2024-07-16 DIAGNOSIS — O09.291 HISTORY OF STILLBIRTH IN CURRENTLY PREGNANT PATIENT, FIRST TRIMESTER: ICD-10-CM

## 2024-07-16 PROCEDURE — 99204 OFFICE O/P NEW MOD 45 MIN: CPT | Mod: TH,S$GLB,, | Performed by: OBSTETRICS & GYNECOLOGY

## 2024-07-16 PROCEDURE — 1160F RVW MEDS BY RX/DR IN RCRD: CPT | Mod: CPTII,S$GLB,, | Performed by: OBSTETRICS & GYNECOLOGY

## 2024-07-16 PROCEDURE — 3074F SYST BP LT 130 MM HG: CPT | Mod: CPTII,S$GLB,, | Performed by: OBSTETRICS & GYNECOLOGY

## 2024-07-16 PROCEDURE — 1159F MED LIST DOCD IN RCRD: CPT | Mod: CPTII,S$GLB,, | Performed by: OBSTETRICS & GYNECOLOGY

## 2024-07-16 PROCEDURE — 3008F BODY MASS INDEX DOCD: CPT | Mod: CPTII,S$GLB,, | Performed by: OBSTETRICS & GYNECOLOGY

## 2024-07-16 PROCEDURE — 3078F DIAST BP <80 MM HG: CPT | Mod: CPTII,S$GLB,, | Performed by: OBSTETRICS & GYNECOLOGY

## 2024-07-16 PROCEDURE — 76801 OB US < 14 WKS SINGLE FETUS: CPT | Mod: S$GLB,,, | Performed by: OBSTETRICS & GYNECOLOGY

## 2024-07-16 RX ORDER — ENOXAPARIN SODIUM 100 MG/ML
40 INJECTION SUBCUTANEOUS DAILY
Qty: 30 EACH | Refills: 5 | Status: SHIPPED | OUTPATIENT
Start: 2024-07-16

## 2024-07-16 RX ORDER — PROGESTERONE 50 MG/ML
1.5 INJECTION, SOLUTION INTRAMUSCULAR DAILY
COMMUNITY

## 2024-07-16 RX ORDER — ASPIRIN 81 MG/1
81 TABLET ORAL ONCE
COMMUNITY

## 2024-07-17 NOTE — ASSESSMENT & PLAN NOTE
She reports a history of anxiety surrounding her recent pregnancy loss. She may be at increased risk for peripartum and postpartum mood disorders. Precautions provided.      We discussed the usage of medications for anxiety in pregnancy if she feels she may need.     It is important to optimize mental health conditions during pregnancy in an effort to reduce the risk of postpartum mood disorders. There are options for management including psychotherapy, counseling, cognitive behavioral therapy, exercise/yoga, journaling, and psychiatry services.

## 2024-07-17 NOTE — ASSESSMENT & PLAN NOTE
I reviewed the patient's obstetric history which is remarkable for a previous stillbirth at 32 weeks gestation. Maternal medical history is significant for possible partial placental abruption. There is no significant family history. She was found to have lupus anticoagulant positivity x 1 in December. Repeat was negative and beta 2 glycoprotein and anticardiolipin was negative.   Work-up included:  Genetic testing not done  Antiphospholipid testing (APS):  LAC pos 12/21, repeat neg  B2 glycoprotein IgG/IgM neg  Anticardiolipin IgG/IgM neg  Kleihauer Betke test (or flow cytometry) for fetal-maternal hemorrhage: not done  Type and screen shows no antibodies and RPR neg  Urine toxicology neg  OB Glucose screening was normal  Placental pathology which showed no unusual findings  Autopsy was declined  I counseled the patient regarding the risk for recurrent stillbirth. The recurrence risk depends on the underlying etiology. However, if the cause of fetal death in a low-risk individual was not discovered, the risk of recurrence is estimated to be 7.8 to 10.5 per 1000 births. This pregnancy, she is at risk for FGR and abruption above the population level.     Although she did not have antiphospholipid antibody positivity confirmed on second blood draw, she has an adverse pregnancy event plus LAC+. I recommend starting lovenox prophylaxis (40mg daily) which is associated with reduction in morbidity.     Recommendations:  Serial fetal growth ultrasounds every 4-6 weeks  Fetal movement awareness, starting at 27-28 weeks  Initiate twice weekly antepartum surveillance at 32 weeks (or 2 weeks prior to gestational age at time of prior stillbirth, but not < 28 weeks)  Delivery at 38 0/7 - 38 6/7 weeks gestation, unless indicated earlier by maternal or obstetric condition (e.g. diabetes, etc)  Close monitoring for peripartum mood disorders

## 2024-07-17 NOTE — PROGRESS NOTES
MATERNAL-FETAL MEDICINE   CONSULT NOTE    Provider requesting consultation: Dr. Tate    SUBJECTIVE:     Ms. Viviane Garnica is a 24 y.o.  female with IUP at 9w0d who is seen in consultation by MFM for evaluation and management of:  Problem   Lupus Anticoagulant Affecting Pregnancy in First Trimester, Antepartum   History of Stillbirth in Currently Pregnant Patient, First Trimester   Anxiety Associated With Birthing Process     She arrives today for consultation for new pregnancy (has preconception visit in May) due to previous intrauterine fetal demise at 32 weeks in December.  Fetal weight was typical for that age and she was told there was some placental abruption noted at time of delivery.  She was noted to have +lupus anticoagulant positivity in 2023 with repeat being normal in 3/12/24. She is currently taking ASA 81mg and IM progesterone (IVF pregnancy).       Medication List with Changes/Refills   New Medications    ENOXAPARIN (LOVENOX) 40 MG/0.4 ML SYRG    Inject 0.4 mLs (40 mg total) into the skin once daily.   Current Medications    ASPIRIN (ECOTRIN) 81 MG EC TABLET    Take 81 mg by mouth once.    PRENATAL VIT NO.124/IRON/FOLIC (PRENATAL VITAMIN ORAL)    Take by mouth once daily.    PROGESTERONE (PROGESTERONE IN OIL) 50 MG/ML INJECTION    Inject 1.5 mg into the muscle once daily.   Discontinued Medications    LEUPROLIDE (LUPRON) 1 MG/0.2 ML INJECTION    10 mg nightly. Pt reports she takes 10u qhs       Review of patient's allergies indicates:  No Known Allergies    PMH:  Past Medical History:   Diagnosis Date    Anxiety state 2021 1:28:10 PM    [facility]: Anxiety-No Additional Notes    Other depressive disorder 2021 1:28:03 PM    [facility]: Depression-No Additional Notes       PObHx:  OB History    Para Term  AB Living   3 2 1 1   1   SAB IAB Ectopic Multiple Live Births         0 1      # Outcome Date GA Lbr Ward/2nd  "Weight Sex Type Anes PTL Lv   3 Current            2  23 32w0d  1.871 kg (4 lb 2 oz) F Vag-Spont EPI Y FD      Complications: Abruptio Placenta   1 Term 21 37w6d  2.892 kg (6 lb 6 oz) F Vag-Spont EPI N        PSH:  Past Surgical History:   Procedure Laterality Date    HAND SURGERY      ROOT CANAL      TONSILLECTOMY      WISDOM TOOTH EXTRACTION         Family history:family history includes Breast cancer in her maternal grandmother; Hypertension in her mother.    Social history: reports that she has never smoked. She has never used smokeless tobacco. She reports that she does not currently use alcohol. She reports that she does not use drugs.    Genetic history:  The patient denies any inherited genetic diseases or birth defects in herself or her partner's personal history or family.    Objective:   /78 (BP Location: Right arm)   Pulse 75   Ht 5' 2" (1.575 m)   Wt 56.4 kg (124 lb 7.2 oz)   LMP 2024 (Exact Date)   BMI 22.76 kg/m²       Ultrasound performed. See viewpoint for full ultrasound report.  A viable intrauterine pregnancy is present with normal appearing gestational sac with yolk sac and fetal pole. Cardiac activity is present. A small subchorionic hematoma is noted measuring 1.7cm.      ASSESSMENT/PLAN:     24 y.o.  female with IUP at 9w0d     History of stillbirth in currently pregnant patient, first trimester  I reviewed the patient's obstetric history which is remarkable for a previous stillbirth at 32 weeks gestation. Maternal medical history is significant for possible partial placental abruption. There is no significant family history. She was found to have lupus anticoagulant positivity x 1 in December. Repeat was negative and beta 2 glycoprotein and anticardiolipin was negative.   Work-up included:  Genetic testing not done  Antiphospholipid testing (APS):  LAC pos , repeat neg  B2 glycoprotein IgG/IgM neg  Anticardiolipin IgG/IgM neg  Kleihauer Betke " test (or flow cytometry) for fetal-maternal hemorrhage: not done  Type and screen shows no antibodies and RPR neg  Urine toxicology neg  OB Glucose screening was normal  Placental pathology which showed no unusual findings  Autopsy was declined  I counseled the patient regarding the risk for recurrent stillbirth. The recurrence risk depends on the underlying etiology. However, if the cause of fetal death in a low-risk individual was not discovered, the risk of recurrence is estimated to be 7.8 to 10.5 per 1000 births. This pregnancy, she is at risk for FGR and abruption above the population level.     Although she did not have antiphospholipid antibody positivity confirmed on second blood draw, she has an adverse pregnancy event plus LAC+. I recommend starting lovenox prophylaxis (40mg daily) which is associated with reduction in morbidity.     Recommendations:  Serial fetal growth ultrasounds every 4-6 weeks  Fetal movement awareness, starting at 27-28 weeks  Initiate twice weekly antepartum surveillance at 32 weeks (or 2 weeks prior to gestational age at time of prior stillbirth, but not < 28 weeks)  Delivery at 38 0/7 - 38 6/7 weeks gestation, unless indicated earlier by maternal or obstetric condition (e.g. diabetes, etc)  Close monitoring for peripartum mood disorders      Lupus anticoagulant affecting pregnancy in first trimester, antepartum  I reviewed the risks of +LAC with the patient.  There may be increased risk of venous thromboembolic disease and stroke.  We discussed the pregnancy risks which include prematurity, IUGR, preeclampsia, and miscarriage/stillbirth.     Recommendations:  I recommend prophylactic LMWH during pregnancy and for 6 weeks postpartum.   We recommend aspirin 81 mg daily at confirmation of pregnancy (she is taking)  Growth ultrasounds are recommended at 28, 32, and 36 weeks due to the risk of FGR  Given the risks of stillbirth, twice weekly antepartum testing at 32 weeks gestation is  recommended.    Close surveillance in late second and third trimester for preeclampsia    Anticoagulation recommendations below:  -Prophylaxis Regimen:  For patients with a BMI =< 40, prescribe lovenox 40 mg daily  For patients with a BMI > 40, prescribe lovenox 40 mg BID  UFH dose is 0876-5507 units BID, 7500-51023 units BID, and 36887 units BID in the first, second, and third trimesters, respectively  -Peripartum Management  -MATTY hose/SCDs should be used while anticoagulation is interrupted.  -Regardless of whether the patient is on prophylactic dose UFH or LMWH, the last dose should be 12 hours prior to neuraxial anesthesia. For this reason, we no longer recommend conversion to UFH at 36 weeks.   -In patients with neuraxial anesthesia: prophylactic anticoagulation should not be initiated within 12 hours of neuraxial blockade or within 4 hours of epidural removal, whichever is later; therapeutic anticoagulation with LMWH should not be initiated within 24 hours of neuraxial blockade or within 4 hours of epidural removal, whichever is later.   -Prophylactic anticoagulation (defer to timing related to neuraxial anesthesia)  After vaginal delivery: should be started no sooner than 6 hours    After  delivery: should be started no sooner than 12 hours  -Therapeutic anticoagulation (defer to timing related to neuraxial anesthesia)    After vaginal delivery: should be started no sooner than 12 hours    After  delivery: should be started no sooner than 18-24 hours  For patients at exceedingly high risk of VTE, this may be modified in consultation with MFM.    Breastfeeding is compatible with warfarin, UFH, and LMWH.      Anxiety associated with birthing process  She reports a history of anxiety surrounding her recent pregnancy loss. She may be at increased risk for peripartum and postpartum mood disorders. Precautions provided.      We discussed the usage of medications for anxiety in pregnancy if she feels  she may need.     It is important to optimize mental health conditions during pregnancy in an effort to reduce the risk of postpartum mood disorders. There are options for management including psychotherapy, counseling, cognitive behavioral therapy, exercise/yoga, journaling, and psychiatry services.         FOLLOW UP:   F/u in 4 weeks for US/MFM visit      Christine Sanchez  Maternal-Fetal Medicine    Electronically Signed by Christine Sanchez July 16, 2024

## 2024-07-17 NOTE — ASSESSMENT & PLAN NOTE
I reviewed the risks of +LAC with the patient.  There may be increased risk of venous thromboembolic disease and stroke.  We discussed the pregnancy risks which include prematurity, IUGR, preeclampsia, and miscarriage/stillbirth.     Recommendations:  I recommend prophylactic LMWH during pregnancy and for 6 weeks postpartum.   We recommend aspirin 81 mg daily at confirmation of pregnancy (she is taking)  Growth ultrasounds are recommended at 28, 32, and 36 weeks due to the risk of FGR  Given the risks of stillbirth, twice weekly antepartum testing at 32 weeks gestation is recommended.    Close surveillance in late second and third trimester for preeclampsia    Anticoagulation recommendations below:  -Prophylaxis Regimen:  For patients with a BMI =< 40, prescribe lovenox 40 mg daily  For patients with a BMI > 40, prescribe lovenox 40 mg BID  UFH dose is 8252-0380 units BID, 7500-79692 units BID, and 76341 units BID in the first, second, and third trimesters, respectively  -Peripartum Management  -MATTY hose/SCDs should be used while anticoagulation is interrupted.  -Regardless of whether the patient is on prophylactic dose UFH or LMWH, the last dose should be 12 hours prior to neuraxial anesthesia. For this reason, we no longer recommend conversion to UFH at 36 weeks.   -In patients with neuraxial anesthesia: prophylactic anticoagulation should not be initiated within 12 hours of neuraxial blockade or within 4 hours of epidural removal, whichever is later; therapeutic anticoagulation with LMWH should not be initiated within 24 hours of neuraxial blockade or within 4 hours of epidural removal, whichever is later.   -Prophylactic anticoagulation (defer to timing related to neuraxial anesthesia)  After vaginal delivery: should be started no sooner than 6 hours    After  delivery: should be started no sooner than 12 hours  -Therapeutic anticoagulation (defer to timing related to neuraxial anesthesia)    After  vaginal delivery: should be started no sooner than 12 hours    After  delivery: should be started no sooner than 18-24 hours  For patients at exceedingly high risk of VTE, this may be modified in consultation with MFM.    Breastfeeding is compatible with warfarin, UFH, and LMWH.

## 2024-07-24 PROCEDURE — 87045 FECES CULTURE AEROBIC BACT: CPT | Performed by: OBSTETRICS & GYNECOLOGY

## 2024-07-24 PROCEDURE — 87209 SMEAR COMPLEX STAIN: CPT | Performed by: OBSTETRICS & GYNECOLOGY

## 2024-07-25 ENCOUNTER — LAB VISIT (OUTPATIENT)
Dept: LAB | Facility: HOSPITAL | Age: 24
End: 2024-07-25
Attending: OBSTETRICS & GYNECOLOGY
Payer: MEDICAID

## 2024-07-25 ENCOUNTER — LAB REQUISITION (OUTPATIENT)
Dept: LAB | Facility: HOSPITAL | Age: 24
End: 2024-07-25
Payer: MEDICAID

## 2024-07-25 DIAGNOSIS — Z34.01 ENCOUNTER FOR SUPERVISION OF NORMAL FIRST PREGNANCY IN FIRST TRIMESTER: Primary | ICD-10-CM

## 2024-07-25 DIAGNOSIS — Z34.83 ENCOUNTER FOR SUPERVISION OF OTHER NORMAL PREGNANCY, THIRD TRIMESTER: ICD-10-CM

## 2024-07-25 LAB
ERYTHROCYTE [DISTWIDTH] IN BLOOD BY AUTOMATED COUNT: 12.3 % (ref 11.5–17)
GROUP & RH: NORMAL
HCT VFR BLD AUTO: 39.3 % (ref 37–47)
HGB BLD-MCNC: 13.5 G/DL (ref 12–16)
INDIRECT COOMBS: NORMAL
MCH RBC QN AUTO: 31.3 PG (ref 27–31)
MCHC RBC AUTO-ENTMCNC: 34.4 G/DL (ref 33–36)
MCV RBC AUTO: 91.2 FL (ref 80–94)
NRBC BLD AUTO-RTO: 0 %
PLATELET # BLD AUTO: 277 X10(3)/MCL (ref 130–400)
PMV BLD AUTO: 9.7 FL (ref 7.4–10.4)
RBC # BLD AUTO: 4.31 X10(6)/MCL (ref 4.2–5.4)
SPECIMEN OUTDATE: NORMAL
T PALLIDUM AB SER QL: NONREACTIVE
WBC # BLD AUTO: 7.86 X10(3)/MCL (ref 4.5–11.5)

## 2024-07-25 PROCEDURE — 86901 BLOOD TYPING SEROLOGIC RH(D): CPT | Performed by: OBSTETRICS & GYNECOLOGY

## 2024-07-25 PROCEDURE — 87340 HEPATITIS B SURFACE AG IA: CPT

## 2024-07-25 PROCEDURE — 85027 COMPLETE CBC AUTOMATED: CPT

## 2024-07-25 PROCEDURE — 86762 RUBELLA ANTIBODY: CPT

## 2024-07-25 PROCEDURE — 86780 TREPONEMA PALLIDUM: CPT

## 2024-07-25 PROCEDURE — 87086 URINE CULTURE/COLONY COUNT: CPT

## 2024-07-25 PROCEDURE — 86850 RBC ANTIBODY SCREEN: CPT | Performed by: OBSTETRICS & GYNECOLOGY

## 2024-07-25 PROCEDURE — 87389 HIV-1 AG W/HIV-1&-2 AB AG IA: CPT

## 2024-07-25 PROCEDURE — 85660 RBC SICKLE CELL TEST: CPT

## 2024-07-25 PROCEDURE — 36415 COLL VENOUS BLD VENIPUNCTURE: CPT

## 2024-07-26 LAB
HBV SURFACE AG SERPL QL IA: NONREACTIVE
HGB S BLD QL SOLY: NEGATIVE
HIV 1+2 AB+HIV1 P24 AG SERPL QL IA: NONREACTIVE
RUBV IGG SERPL IA-ACNC: 4.4
RUBV IGG SERPL QL IA: POSITIVE

## 2024-07-27 LAB
BACTERIA STL CULT: NORMAL
BACTERIA UR CULT: NORMAL

## 2024-07-31 LAB — O+P STL MICRO: NORMAL

## 2024-08-08 DIAGNOSIS — O09.291 HISTORY OF STILLBIRTH IN CURRENTLY PREGNANT PATIENT, FIRST TRIMESTER: ICD-10-CM

## 2024-08-08 DIAGNOSIS — D68.61 ANTIPHOSPHOLIPID ANTIBODY SYNDROME: Primary | ICD-10-CM

## 2024-08-08 DIAGNOSIS — O99.111 LUPUS ANTICOAGULANT AFFECTING PREGNANCY IN FIRST TRIMESTER, ANTEPARTUM: ICD-10-CM

## 2024-08-08 DIAGNOSIS — D68.62 LUPUS ANTICOAGULANT AFFECTING PREGNANCY IN FIRST TRIMESTER, ANTEPARTUM: ICD-10-CM

## 2024-08-14 ENCOUNTER — PROCEDURE VISIT (OUTPATIENT)
Dept: MATERNAL FETAL MEDICINE | Facility: CLINIC | Age: 24
End: 2024-08-14
Payer: MEDICAID

## 2024-08-14 ENCOUNTER — OFFICE VISIT (OUTPATIENT)
Dept: MATERNAL FETAL MEDICINE | Facility: CLINIC | Age: 24
End: 2024-08-14
Payer: MEDICAID

## 2024-08-14 VITALS
HEART RATE: 79 BPM | HEIGHT: 62 IN | BODY MASS INDEX: 22.87 KG/M2 | WEIGHT: 124.25 LBS | SYSTOLIC BLOOD PRESSURE: 119 MMHG | DIASTOLIC BLOOD PRESSURE: 80 MMHG

## 2024-08-14 DIAGNOSIS — F41.9 ANXIETY ASSOCIATED WITH BIRTHING PROCESS: ICD-10-CM

## 2024-08-14 DIAGNOSIS — O99.111 LUPUS ANTICOAGULANT AFFECTING PREGNANCY IN FIRST TRIMESTER, ANTEPARTUM: Primary | ICD-10-CM

## 2024-08-14 DIAGNOSIS — D68.62 LUPUS ANTICOAGULANT AFFECTING PREGNANCY IN FIRST TRIMESTER, ANTEPARTUM: Primary | ICD-10-CM

## 2024-08-14 DIAGNOSIS — O09.291 HISTORY OF STILLBIRTH IN CURRENTLY PREGNANT PATIENT, FIRST TRIMESTER: ICD-10-CM

## 2024-08-14 DIAGNOSIS — D68.61 ANTIPHOSPHOLIPID ANTIBODY SYNDROME: ICD-10-CM

## 2024-08-14 DIAGNOSIS — O99.111 LUPUS ANTICOAGULANT AFFECTING PREGNANCY IN FIRST TRIMESTER, ANTEPARTUM: ICD-10-CM

## 2024-08-14 DIAGNOSIS — D68.62 LUPUS ANTICOAGULANT AFFECTING PREGNANCY IN FIRST TRIMESTER, ANTEPARTUM: ICD-10-CM

## 2024-08-14 DIAGNOSIS — O99.340 ANXIETY ASSOCIATED WITH BIRTHING PROCESS: ICD-10-CM

## 2024-08-14 PROBLEM — Z30.9 CONTRACEPTION MANAGEMENT: Status: RESOLVED | Noted: 2021-12-27 | Resolved: 2024-08-14

## 2024-08-14 PROBLEM — Z3A.32 32 WEEKS GESTATION OF PREGNANCY: Status: RESOLVED | Noted: 2023-12-17 | Resolved: 2024-08-14

## 2024-08-14 RX ORDER — FLUOXETINE 10 MG/1
10 CAPSULE ORAL DAILY
Qty: 30 CAPSULE | Refills: 6 | Status: SHIPPED | OUTPATIENT
Start: 2024-08-14

## 2024-08-14 NOTE — ASSESSMENT & PLAN NOTE
I reviewed the risks of +LAC with the patient.  There may be increased risk of venous thromboembolic disease and stroke.  We discussed the pregnancy risks which include prematurity, IUGR, preeclampsia, and miscarriage/stillbirth.     Recommendations:  We recommend aspirin 81 mg daily at confirmation of pregnancy (she is taking)  Growth ultrasounds are recommended at 28, 32, and 36 weeks due to the risk of FGR  Given the risks of stillbirth, twice weekly antepartum testing at 32 weeks gestation is recommended.    Close surveillance in late second and third trimester for preeclampsia  Recommend prophylactic LMWH during pregnancy and for 6 weeks postpartum (see below)  Prophylaxis Regimen:  -For patients with a BMI =< 40, prescribe lovenox 40 mg daily  -For patients with a BMI > 40, prescribe lovenox 40 mg BID  -UFH dose is 5268-2268 units BID, 7500-58882 units BID, and 57587 units BID in the first, second, and third trimesters, respectively    Peripartum Management  -MATTY hose/SCDs should be used while anticoagulation is interrupted.  -Regardless of whether the patient is on prophylactic dose UFH or LMWH, the last dose should be 12 hours prior to neuraxial anesthesia. For this reason, we no longer recommend conversion to UFH at 36 weeks.     Postpartum Management  -In patients with neuraxial anesthesia: prophylactic anticoagulation should not be restarted within 12 hours of neuraxial blockade or within 4 hours of epidural removal, whichever is later  -Prophylactic anticoagulation (defer to timing related to neuraxial anesthesia)  After vaginal delivery: should be started no sooner than 6 hours  After  delivery: should be started no sooner than 12 hours  -Breastfeeding is compatible with warfarin, UFH, and LMWH.

## 2024-08-14 NOTE — PROGRESS NOTES
"Maternal Fetal Medicine follow up consult    SUBJECTIVE:     Viviane Garnica is a 24 y.o.  female with IUP at 13w1d who is seen in follow up consultation by MFM.  Pregnancy complications include:   Problem   - Lupus anticoagulant affecting pregnancy in first trimester, antepartum   - History of stillbirth in currently pregnant patient, first trimester   - Anxiety associated with birthing process   32 Weeks Gestation of Pregnancy (Resolved)   Contraception Management (Resolved)   Postpartum Exam (Resolved)     Viviane presents for routine follow up appointment.  She reports she is having difficulty refilling Lovenox Rx as she had the prescription transferred. We have called her pharmacy this morning and will aid in getting refill ASAP. She has not missed doses yet.  She has complaints of worsening anxiety and depression d/t pregnancy history. She has taken paxil, zoloft and lexapro among others in the past with varied results. We have agreed on prozac today.   Denies LOF, VB, contractions.    Previous notes reviewed.   No changes to medical, surgical, family, social, or obstetric history.  Interval history since last M visit: see above  Medications reviewed.    Care team members:  Dr DANNY Tate - Primary OB     OBJECTIVE:   /80 (BP Location: Right arm)   Pulse 79   Ht 5' 2" (1.575 m)   Wt 56.4 kg (124 lb 3.7 oz)   LMP 2024 (Exact Date)   BMI 22.72 kg/m²     Ultrasound performed. See viewpoint for full ultrasound report.    A trent living IUP is identified, and the CRL is appropriate for established gestational age. The NT measures normally at 1.1mm. Maternal structures such as uterus, cervix, and ovaries appear normal. The subchorionic hematoma is noted.    She desires NIPT and it was drawn today    ASSESSMENT/PLAN:     24 y.o.  female with IUP at 13w1d    - Lupus anticoagulant affecting pregnancy in first trimester, antepartum  I reviewed the risks of +LAC with the " patient.  There may be increased risk of venous thromboembolic disease and stroke.  We discussed the pregnancy risks which include prematurity, IUGR, preeclampsia, and miscarriage/stillbirth.     Recommendations:  We recommend aspirin 81 mg daily at confirmation of pregnancy (she is taking)  Growth ultrasounds are recommended at 28, 32, and 36 weeks due to the risk of FGR  Given the risks of stillbirth, twice weekly antepartum testing at 32 weeks gestation is recommended.    Close surveillance in late second and third trimester for preeclampsia  Recommend prophylactic LMWH during pregnancy and for 6 weeks postpartum (see below)  Prophylaxis Regimen:  -For patients with a BMI =< 40, prescribe lovenox 40 mg daily  -For patients with a BMI > 40, prescribe lovenox 40 mg BID  -UFH dose is 5546-2934 units BID, 7500-70241 units BID, and 12957 units BID in the first, second, and third trimesters, respectively    Peripartum Management  -MATTY hose/SCDs should be used while anticoagulation is interrupted.  -Regardless of whether the patient is on prophylactic dose UFH or LMWH, the last dose should be 12 hours prior to neuraxial anesthesia. For this reason, we no longer recommend conversion to UFH at 36 weeks.     Postpartum Management  -In patients with neuraxial anesthesia: prophylactic anticoagulation should not be restarted within 12 hours of neuraxial blockade or within 4 hours of epidural removal, whichever is later  -Prophylactic anticoagulation (defer to timing related to neuraxial anesthesia)  After vaginal delivery: should be started no sooner than 6 hours  After  delivery: should be started no sooner than 12 hours  -Breastfeeding is compatible with warfarin, UFH, and LMWH.      - History of stillbirth in currently pregnant patient, first trimester  I reviewed the patient's obstetric history which is remarkable for a previous stillbirth at 32 weeks gestation. Maternal medical history is significant for possible  partial placental abruption. There is no significant family history. She was found to have lupus anticoagulant positivity x 1 in December. Repeat was negative and beta 2 glycoprotein and anticardiolipin was negative.   Work-up included:  Genetic testing not done  Antiphospholipid testing (APS):  LAC pos 12/21, repeat neg  B2 glycoprotein IgG/IgM neg  Anticardiolipin IgG/IgM neg  Kleihauer Betke test (or flow cytometry) for fetal-maternal hemorrhage: not done  Type and screen shows no antibodies and RPR neg  Urine toxicology neg  OB Glucose screening was normal  Placental pathology which showed no unusual findings  Autopsy was declined  I counseled the patient regarding the risk for recurrent stillbirth. The recurrence risk depends on the underlying etiology. However, if the cause of fetal death in a low-risk individual was not discovered, the risk of recurrence is estimated to be 7.8 to 10.5 per 1000 births. This pregnancy, she is at risk for FGR and abruption above the population level.     Although she did not have antiphospholipid antibody positivity confirmed on second blood draw, she has an adverse pregnancy event plus LAC+. I recommend starting lovenox prophylaxis (40mg daily) which is associated with reduction in morbidity.     Recommendations:  Serial fetal growth ultrasounds every 4-6 weeks  Fetal movement awareness, starting at 27-28 weeks  Initiate twice weekly antepartum surveillance at 32 weeks (or 2 weeks prior to gestational age at time of prior stillbirth, but not < 28 weeks)  Delivery at 38 0/7 - 38 6/7 weeks gestation, unless indicated earlier by maternal or obstetric condition (e.g. diabetes, etc)  Close monitoring for peripartum mood disorders      - Anxiety associated with birthing process  She reports a history of anxiety surrounding her recent pregnancy loss. She may be at increased risk for peripartum and postpartum mood disorders. Precautions provided.      We discussed the usage of  medications for anxiety in pregnancy if she feels she may need.     It is important to optimize mental health conditions during pregnancy in an effort to reduce the risk of postpartum mood disorders. There are options for management including psychotherapy, counseling, cognitive behavioral therapy, exercise/yoga, journaling, and psychiatry services.     8/14/24- She has complaints of worsening anxiety/depression given her OB history which is understandable. We have discussed options and she is interested in starting a low dose of Prozac. Rx sent. She will talk to her primary OB to reschedule her appts there to be staggered with our clinic so she can check in with a provider approx every 2 weeks.      F/u in 4 weeks for MFM visit /growth ultrasound    Christine Sanchez MD  Maternal Fetal Medicine

## 2024-08-14 NOTE — ASSESSMENT & PLAN NOTE
She reports a history of anxiety surrounding her recent pregnancy loss. She may be at increased risk for peripartum and postpartum mood disorders. Precautions provided.      We discussed the usage of medications for anxiety in pregnancy if she feels she may need.     It is important to optimize mental health conditions during pregnancy in an effort to reduce the risk of postpartum mood disorders. There are options for management including psychotherapy, counseling, cognitive behavioral therapy, exercise/yoga, journaling, and psychiatry services.     8/14/24- She has complaints of worsening anxiety/depression given her OB history which is understandable. We have discussed options and she is interested in starting a low dose of Prozac. Rx sent. She will talk to her primary OB to reschedule her appts there to be staggered with our clinic so she can check in with a provider approx every 2 weeks.

## 2024-08-20 ENCOUNTER — TELEPHONE (OUTPATIENT)
Dept: MATERNAL FETAL MEDICINE | Facility: CLINIC | Age: 24
End: 2024-08-20
Payer: MEDICAID

## 2024-08-20 NOTE — TELEPHONE ENCOUNTER
I spoke with pt on 8/14/24, she reports she was able to  her Lovenox rx with no problem, she reports no PA was needed at that time, I informed pt if she has any more trouble when getting her Rx to let me know. Pt verbalized understanding.

## 2024-08-26 ENCOUNTER — TELEPHONE (OUTPATIENT)
Dept: MATERNAL FETAL MEDICINE | Facility: CLINIC | Age: 24
End: 2024-08-26
Payer: MEDICAID

## 2024-08-26 NOTE — TELEPHONE ENCOUNTER
I called pt regarding her prescription for the Lovenox, pt reports there were no issues with her getting her prescription filled. I instructed pt to please call with any concerns in the future regarding her prescription, pt verbalized understanding.

## 2024-08-28 ENCOUNTER — LAB VISIT (OUTPATIENT)
Dept: LAB | Facility: HOSPITAL | Age: 24
End: 2024-08-28
Attending: OBSTETRICS & GYNECOLOGY
Payer: MEDICAID

## 2024-08-28 ENCOUNTER — PROCEDURE VISIT (OUTPATIENT)
Dept: MATERNAL FETAL MEDICINE | Facility: CLINIC | Age: 24
End: 2024-08-28
Payer: MEDICAID

## 2024-08-28 VITALS — SYSTOLIC BLOOD PRESSURE: 125 MMHG | HEART RATE: 71 BPM | DIASTOLIC BLOOD PRESSURE: 83 MMHG

## 2024-08-28 DIAGNOSIS — N39.0 URINARY TRACT INFECTION, SITE NOT SPECIFIED: Primary | ICD-10-CM

## 2024-08-28 DIAGNOSIS — Z87.59 HISTORY OF IUFD: ICD-10-CM

## 2024-08-28 DIAGNOSIS — Z87.59 HISTORY OF IUFD: Primary | ICD-10-CM

## 2024-08-28 LAB
BACTERIA #/AREA URNS AUTO: ABNORMAL /HPF
BILIRUB UR QL STRIP.AUTO: NEGATIVE
CLARITY UR: CLEAR
COLOR UR AUTO: COLORLESS
GLUCOSE UR QL STRIP: ABNORMAL
HGB UR QL STRIP: NEGATIVE
KETONES UR QL STRIP: NEGATIVE
LEUKOCYTE ESTERASE UR QL STRIP: 75
NITRITE UR QL STRIP: NEGATIVE
PH UR STRIP: 6 [PH]
PROT UR QL STRIP: NEGATIVE
RBC #/AREA URNS AUTO: ABNORMAL /HPF
SP GR UR STRIP.AUTO: 1 (ref 1–1.03)
SQUAMOUS #/AREA URNS LPF: ABNORMAL /HPF
UROBILINOGEN UR STRIP-ACNC: NORMAL
WBC #/AREA URNS AUTO: ABNORMAL /HPF

## 2024-08-28 PROCEDURE — 81001 URINALYSIS AUTO W/SCOPE: CPT

## 2024-08-28 PROCEDURE — 87086 URINE CULTURE/COLONY COUNT: CPT

## 2024-08-28 PROCEDURE — 76815 OB US LIMITED FETUS(S): CPT | Mod: S$GLB,,, | Performed by: OBSTETRICS & GYNECOLOGY

## 2024-08-30 LAB — BACTERIA UR CULT: NORMAL

## 2024-09-06 ENCOUNTER — PATIENT MESSAGE (OUTPATIENT)
Dept: MATERNAL FETAL MEDICINE | Facility: CLINIC | Age: 24
End: 2024-09-06
Payer: MEDICAID

## 2024-09-06 ENCOUNTER — LAB VISIT (OUTPATIENT)
Dept: LAB | Facility: HOSPITAL | Age: 24
End: 2024-09-06
Attending: OBSTETRICS & GYNECOLOGY
Payer: MEDICAID

## 2024-09-06 DIAGNOSIS — Z34.01 ENCOUNTER FOR SUPERVISION OF NORMAL FIRST PREGNANCY IN FIRST TRIMESTER: Primary | ICD-10-CM

## 2024-09-06 LAB — PROGEST SERPL-MCNC: 55.8 NG/ML

## 2024-09-06 PROCEDURE — 36415 COLL VENOUS BLD VENIPUNCTURE: CPT

## 2024-09-06 PROCEDURE — 84144 ASSAY OF PROGESTERONE: CPT

## 2024-09-17 ENCOUNTER — PROCEDURE VISIT (OUTPATIENT)
Dept: MATERNAL FETAL MEDICINE | Facility: CLINIC | Age: 24
End: 2024-09-17
Payer: MEDICAID

## 2024-09-17 ENCOUNTER — OFFICE VISIT (OUTPATIENT)
Dept: MATERNAL FETAL MEDICINE | Facility: CLINIC | Age: 24
End: 2024-09-17
Payer: MEDICAID

## 2024-09-17 VITALS
HEIGHT: 62 IN | HEART RATE: 75 BPM | SYSTOLIC BLOOD PRESSURE: 120 MMHG | WEIGHT: 126 LBS | BODY MASS INDEX: 23.19 KG/M2 | DIASTOLIC BLOOD PRESSURE: 81 MMHG

## 2024-09-17 DIAGNOSIS — O99.340 ANXIETY ASSOCIATED WITH BIRTHING PROCESS: ICD-10-CM

## 2024-09-17 DIAGNOSIS — D68.62 LUPUS ANTICOAGULANT AFFECTING PREGNANCY IN SECOND TRIMESTER, ANTEPARTUM: ICD-10-CM

## 2024-09-17 DIAGNOSIS — F41.9 ANXIETY ASSOCIATED WITH BIRTHING PROCESS: ICD-10-CM

## 2024-09-17 DIAGNOSIS — O09.291 HISTORY OF STILLBIRTH IN CURRENTLY PREGNANT PATIENT, FIRST TRIMESTER: ICD-10-CM

## 2024-09-17 DIAGNOSIS — O35.03X0 CHOROID PLEXUS CYST OF FETUS AFFECTING CARE OF MOTHER, ANTEPARTUM, SINGLE OR UNSPECIFIED FETUS: ICD-10-CM

## 2024-09-17 DIAGNOSIS — O09.292 HISTORY OF STILLBIRTH IN CURRENTLY PREGNANT PATIENT, SECOND TRIMESTER: Primary | ICD-10-CM

## 2024-09-17 DIAGNOSIS — O99.111 LUPUS ANTICOAGULANT AFFECTING PREGNANCY IN FIRST TRIMESTER, ANTEPARTUM: ICD-10-CM

## 2024-09-17 DIAGNOSIS — D68.62 LUPUS ANTICOAGULANT AFFECTING PREGNANCY IN FIRST TRIMESTER, ANTEPARTUM: ICD-10-CM

## 2024-09-17 DIAGNOSIS — Z78.9 CONCEIVED BY IN VITRO FERTILIZATION: ICD-10-CM

## 2024-09-17 DIAGNOSIS — O99.112 LUPUS ANTICOAGULANT AFFECTING PREGNANCY IN SECOND TRIMESTER, ANTEPARTUM: ICD-10-CM

## 2024-09-17 PROCEDURE — 3008F BODY MASS INDEX DOCD: CPT | Mod: CPTII,S$GLB,, | Performed by: OBSTETRICS & GYNECOLOGY

## 2024-09-17 PROCEDURE — 1160F RVW MEDS BY RX/DR IN RCRD: CPT | Mod: CPTII,S$GLB,, | Performed by: OBSTETRICS & GYNECOLOGY

## 2024-09-17 PROCEDURE — 3074F SYST BP LT 130 MM HG: CPT | Mod: CPTII,S$GLB,, | Performed by: OBSTETRICS & GYNECOLOGY

## 2024-09-17 PROCEDURE — 1159F MED LIST DOCD IN RCRD: CPT | Mod: CPTII,S$GLB,, | Performed by: OBSTETRICS & GYNECOLOGY

## 2024-09-17 PROCEDURE — 76817 TRANSVAGINAL US OBSTETRIC: CPT | Mod: S$GLB,,, | Performed by: OBSTETRICS & GYNECOLOGY

## 2024-09-17 PROCEDURE — 99214 OFFICE O/P EST MOD 30 MIN: CPT | Mod: TH,S$GLB,, | Performed by: OBSTETRICS & GYNECOLOGY

## 2024-09-17 PROCEDURE — 3079F DIAST BP 80-89 MM HG: CPT | Mod: CPTII,S$GLB,, | Performed by: OBSTETRICS & GYNECOLOGY

## 2024-09-17 PROCEDURE — 76811 OB US DETAILED SNGL FETUS: CPT | Mod: S$GLB,,, | Performed by: OBSTETRICS & GYNECOLOGY

## 2024-09-17 NOTE — ASSESSMENT & PLAN NOTE
I reviewed the patient's obstetric history which is remarkable for a previous stillbirth at 32 weeks gestation. Maternal medical history is significant for possible partial placental abruption. There is no significant family history. She was found to have lupus anticoagulant positivity x 1 in December. Repeat was negative and beta 2 glycoprotein and anticardiolipin was negative.   Work-up included:  Genetic testing not done  Antiphospholipid testing (APS):  LAC pos 12/21, repeat neg  B2 glycoprotein IgG/IgM neg  Anticardiolipin IgG/IgM neg  Kleihauer Betke test (or flow cytometry) for fetal-maternal hemorrhage: not done  Type and screen shows no antibodies and RPR neg  Urine toxicology neg  OB Glucose screening was normal  Placental pathology which showed no unusual findings  Autopsy was declined  I counseled the patient regarding the risk for recurrent stillbirth. The recurrence risk depends on the underlying etiology. However, if the cause of fetal death in a low-risk individual was not discovered, the risk of recurrence is estimated to be 7.8 to 10.5 per 1000 births. This pregnancy, she is at risk for FGR and abruption above the population level.     Although she did not have antiphospholipid antibody positivity confirmed on second blood draw, she has an adverse pregnancy event plus LAC+. I recommend starting lovenox prophylaxis (40mg daily) which is associated with reduction in morbidity.     Recommendations:  Serial fetal growth ultrasounds every 4 weeks  Fetal movement awareness, starting at 27-28 weeks  Initiate twice weekly antepartum surveillance at 32 weeks (or 2 weeks prior to gestational age at time of prior stillbirth, but not < 28 weeks) - will start at 30w  Delivery at 38 0/7 - 38 6/7 weeks gestation, unless indicated earlier by maternal or obstetric condition (e.g. diabetes, etc)  Close monitoring for peripartum mood disorders

## 2024-09-17 NOTE — ASSESSMENT & PLAN NOTE
I reviewed the risks of +LAC with the patient.  There may be increased risk of venous thromboembolic disease and stroke.  We discussed the pregnancy risks which include prematurity, IUGR, preeclampsia, and miscarriage/stillbirth.     Recommendations:  We recommend aspirin 81 mg daily at confirmation of pregnancy (she is taking)  Growth ultrasounds are recommended at 28, 32, and 36 weeks due to the risk of FGR  Given the risks of stillbirth, twice weekly antepartum testing at 32 weeks gestation is recommended.    Close surveillance in late second and third trimester for preeclampsia  Recommend prophylactic LMWH during pregnancy and for 6 weeks postpartum (see below)  Prophylaxis Regimen:  -For patients with a BMI =< 40, prescribe lovenox 40 mg daily  -For patients with a BMI > 40, prescribe lovenox 40 mg BID  -UFH dose is 1806-1441 units BID, 7500-66349 units BID, and 58885 units BID in the first, second, and third trimesters, respectively    Peripartum Management  -MATTY hose/SCDs should be used while anticoagulation is interrupted.  -Regardless of whether the patient is on prophylactic dose UFH or LMWH, the last dose should be 12 hours prior to neuraxial anesthesia. For this reason, we no longer recommend conversion to UFH at 36 weeks.     Postpartum Management  -In patients with neuraxial anesthesia: prophylactic anticoagulation should not be restarted within 12 hours of neuraxial blockade or within 4 hours of epidural removal, whichever is later  -Prophylactic anticoagulation (defer to timing related to neuraxial anesthesia)  After vaginal delivery: should be started no sooner than 6 hours  After  delivery: should be started no sooner than 12 hours  -Breastfeeding is compatible with warfarin, UFH, and LMWH.

## 2024-09-17 NOTE — ASSESSMENT & PLAN NOTE
She reports a history of anxiety surrounding her recent pregnancy loss. She may be at increased risk for peripartum and postpartum mood disorders. Precautions provided.      We discussed the usage of medications for anxiety in pregnancy if she feels she may need.     It is important to optimize mental health conditions during pregnancy in an effort to reduce the risk of postpartum mood disorders. There are options for management including psychotherapy, counseling, cognitive behavioral therapy, exercise/yoga, journaling, and psychiatry services.     8/14/24- She has complaints of worsening anxiety/depression given her OB history which is understandable. We have discussed options and she is interested in starting a low dose of Prozac. Rx sent. She will talk to her primary OB to reschedule her appts there to be staggered with our clinic so she can check in with a provider approx every 2 weeks.    9/17/24- she did not start the Prozac after our last appointment.  She states that anxiety is still significant at times.  Today she is tearful we had a long discussion after mentioned of choroid plexus cysts on ultrasound today.  I am encouraging her to seek counseling with [facility].

## 2024-09-17 NOTE — ASSESSMENT & PLAN NOTE
The finding of isolated choroid plexus cyst(s) was reviewed with the patient. Choroid plexus cysts are seen in approximately 1 - 3% of normal fetuses and have no functional or clinical significance. They are also seen in approximately 50% of fetuses with trisomy 18; therefore, their primary significance on prenatal sonography is as a potential marker for trisomy 18. Risk assessment for trisomy 18 includes maternal age, other screening tests for trisomy 18, and the presence of other sonographic abnormalities.     The patient has undergone cell-free DNA testing and the result is low risk for Trisomy 18.     No other sonographic abnormalities were noted on the detailed ultrasound study performed today.  Therefore, the risk for Trisomy 18 in this pregnancy is very low.  Nevertheless, the option of genetic amniocentesis for definitive cytogenetic diagnosis was reviewed, including the risks and benefits of the procedure.  The patient declined amniocentesis.  She was also offered consultation with [facility] counselor and she declines at this time.

## 2024-09-17 NOTE — PROGRESS NOTES
"Maternal Fetal Medicine follow up consult    SUBJECTIVE:     Viviane Garnica is a 24 y.o.  female with IUP at 18w0d who is seen in follow up consultation by MFM.  Pregnancy complications include:   Problem   - Choroid plexus cyst of fetus affecting care of mother, antepartum   - Lupus anticoagulant affecting pregnancy in second trimester, antepartum   - History of stillbirth in currently pregnant patient, second trimester   - Anxiety associated with birthing process     Viviane presents for routine follow up appointment.  She is without complaints. Lovenox 40mg once daily  Denies LOF, VB, contractions. Positive fetal movement.  She states that she did not start Prozac after our last discussion.  She did not feel comfortable.  She is unsure whether she is needing it and still has anxiety that has good days and bad days.  She has been in counseling before but has been a long time ago.    Previous notes reviewed.   No changes to medical, surgical, family, social, or obstetric history.  Interval history since last MFM visit: see above  Medications reviewed.    Care team members:  Dr DANNY Tate - Primary OB     OBJECTIVE:   /81 (BP Location: Right arm)   Pulse 75   Ht 5' 2" (1.575 m)   Wt 57.2 kg (125 lb 15.9 oz)   LMP 2024 (Exact Date)   BMI 23.04 kg/m²     Ultrasound performed. See viewpoint for full ultrasound report.    A detailed fetal anatomic ultrasound examination was performed for the following high risk indication: CPCs, IVF, history of IUFD.   No fetal structural malformations are identified; however, fetal imaging is incomplete today as noted above.   Bilateral choroid plexus cysts are noted.  A follow-up study will be scheduled to complete the fetal anatomic survey.   Fetal size today is consistent with established gestational age.   Amniotic fluid volume is normal.  Transvaginal cervical length is normal.  No vasa previa is identified.  Placental location is posterior " without evidence of previa.     ASSESSMENT/PLAN:     24 y.o.  female with IUP at 18w0d    - Lupus anticoagulant affecting pregnancy in second trimester, antepartum  I reviewed the risks of +LAC with the patient.  There may be increased risk of venous thromboembolic disease and stroke.  We discussed the pregnancy risks which include prematurity, IUGR, preeclampsia, and miscarriage/stillbirth.     Recommendations:  We recommend aspirin 81 mg daily at confirmation of pregnancy (she is taking)  Growth ultrasounds are recommended at 28, 32, and 36 weeks due to the risk of FGR  Given the risks of stillbirth, twice weekly antepartum testing at 32 weeks gestation is recommended.    Close surveillance in late second and third trimester for preeclampsia  Recommend prophylactic LMWH during pregnancy and for 6 weeks postpartum (see below)  Prophylaxis Regimen:  -For patients with a BMI =< 40, prescribe lovenox 40 mg daily  -For patients with a BMI > 40, prescribe lovenox 40 mg BID  -UFH dose is 6132-5400 units BID, 7500-42308 units BID, and 77797 units BID in the first, second, and third trimesters, respectively    Peripartum Management  -MATTY hose/SCDs should be used while anticoagulation is interrupted.  -Regardless of whether the patient is on prophylactic dose UFH or LMWH, the last dose should be 12 hours prior to neuraxial anesthesia. For this reason, we no longer recommend conversion to UFH at 36 weeks.     Postpartum Management  -In patients with neuraxial anesthesia: prophylactic anticoagulation should not be restarted within 12 hours of neuraxial blockade or within 4 hours of epidural removal, whichever is later  -Prophylactic anticoagulation (defer to timing related to neuraxial anesthesia)  After vaginal delivery: should be started no sooner than 6 hours  After  delivery: should be started no sooner than 12 hours  -Breastfeeding is compatible with warfarin, UFH, and LMWH.      - History of stillbirth in  currently pregnant patient, second trimester  I reviewed the patient's obstetric history which is remarkable for a previous stillbirth at 32 weeks gestation. Maternal medical history is significant for possible partial placental abruption. There is no significant family history. She was found to have lupus anticoagulant positivity x 1 in December. Repeat was negative and beta 2 glycoprotein and anticardiolipin was negative.   Work-up included:  Genetic testing not done  Antiphospholipid testing (APS):  LAC pos 12/21, repeat neg  B2 glycoprotein IgG/IgM neg  Anticardiolipin IgG/IgM neg  Kleihauer Betke test (or flow cytometry) for fetal-maternal hemorrhage: not done  Type and screen shows no antibodies and RPR neg  Urine toxicology neg  OB Glucose screening was normal  Placental pathology which showed no unusual findings  Autopsy was declined  I counseled the patient regarding the risk for recurrent stillbirth. The recurrence risk depends on the underlying etiology. However, if the cause of fetal death in a low-risk individual was not discovered, the risk of recurrence is estimated to be 7.8 to 10.5 per 1000 births. This pregnancy, she is at risk for FGR and abruption above the population level.     Although she did not have antiphospholipid antibody positivity confirmed on second blood draw, she has an adverse pregnancy event plus LAC+. I recommend starting lovenox prophylaxis (40mg daily) which is associated with reduction in morbidity.     Recommendations:  Serial fetal growth ultrasounds every 4 weeks  Fetal movement awareness, starting at 27-28 weeks  Initiate twice weekly antepartum surveillance at 32 weeks (or 2 weeks prior to gestational age at time of prior stillbirth, but not < 28 weeks) - will start at 30w  Delivery at 38 0/7 - 38 6/7 weeks gestation, unless indicated earlier by maternal or obstetric condition (e.g. diabetes, etc)  Close monitoring for peripartum mood disorders      - Anxiety associated  with birthing process  She reports a history of anxiety surrounding her recent pregnancy loss. She may be at increased risk for peripartum and postpartum mood disorders. Precautions provided.      We discussed the usage of medications for anxiety in pregnancy if she feels she may need.     It is important to optimize mental health conditions during pregnancy in an effort to reduce the risk of postpartum mood disorders. There are options for management including psychotherapy, counseling, cognitive behavioral therapy, exercise/yoga, journaling, and psychiatry services.     8/14/24- She has complaints of worsening anxiety/depression given her OB history which is understandable. We have discussed options and she is interested in starting a low dose of Prozac. Rx sent. She will talk to her primary OB to reschedule her appts there to be staggered with our clinic so she can check in with a provider approx every 2 weeks.    9/17/24- she did not start the Prozac after our last appointment.  She states that anxiety is still significant at times.  Today she is tearful we had a long discussion after mentioned of choroid plexus cysts on ultrasound today.  I am encouraging her to seek counseling with [facility].      - Choroid plexus cyst of fetus affecting care of mother, antepartum  The finding of isolated choroid plexus cyst(s) was reviewed with the patient. Choroid plexus cysts are seen in approximately 1 - 3% of normal fetuses and have no functional or clinical significance. They are also seen in approximately 50% of fetuses with trisomy 18; therefore, their primary significance on prenatal sonography is as a potential marker for trisomy 18. Risk assessment for trisomy 18 includes maternal age, other screening tests for trisomy 18, and the presence of other sonographic abnormalities.     The patient has undergone cell-free DNA testing and the result is low risk for Trisomy 18.     No other sonographic abnormalities  were noted on the detailed ultrasound study performed today.  Therefore, the risk for Trisomy 18 in this pregnancy is very low.  Nevertheless, the option of genetic amniocentesis for definitive cytogenetic diagnosis was reviewed, including the risks and benefits of the procedure.  The patient declined amniocentesis.  She was also offered consultation with [facility] counselor and she declines at this time.      Conceived by in vitro fertilization  The use of IVF has been associated with an increase in preeclampsia, gestational hypertension, placental abruption, placenta previa, and risk of  delivery. Due to the potential increased risk of congenital malformations following IVF, a targeted anatomic ultrasound is recommended at 18-20 weeks. In addition, a fetal echocardiogram may be considered if the targeted images are incomplete given the 2-fold associated risk of congenital cardiac anomalies.  While the association between ART and these adverse outcomes raise some concern, it is important to note that the literature is contradictory on this subject and the chances of having a healthy child conceived with ART are overall extremely high.    Recommendations:  -Targeted ultrasound for anatomical survey at 18-20 weeks.  -Transvaginal sonography at time of anatomy ultrasound to rule out vasa previa  -Consider fetal echocardiogram at 22 weeks if cardiac views are suboptimal on detailed ultrasound.  -Consider low dose aspirin 81 mg daily at 12-16 weeks for preeclampsia risk reduction  -Close monitoring for preeclampsia.  -Fetal growth ultrasound at 32-34 weeks  - other recommendations to be dictated by other comorbidities      F/u in 3 weeks for MFM visit /growth ultrasound    Christine Sanchez MD  Maternal Fetal Medicine

## 2024-09-17 NOTE — ASSESSMENT & PLAN NOTE
The use of IVF has been associated with an increase in preeclampsia, gestational hypertension, placental abruption, placenta previa, and risk of  delivery. Due to the potential increased risk of congenital malformations following IVF, a targeted anatomic ultrasound is recommended at 18-20 weeks. In addition, a fetal echocardiogram may be considered if the targeted images are incomplete given the 2-fold associated risk of congenital cardiac anomalies.  While the association between ART and these adverse outcomes raise some concern, it is important to note that the literature is contradictory on this subject and the chances of having a healthy child conceived with ART are overall extremely high.    Recommendations:  -Targeted ultrasound for anatomical survey at 18-20 weeks.  -Transvaginal sonography at time of anatomy ultrasound to rule out vasa previa  -Consider fetal echocardiogram at 22 weeks if cardiac views are suboptimal on detailed ultrasound.  -Consider low dose aspirin 81 mg daily at 12-16 weeks for preeclampsia risk reduction  -Close monitoring for preeclampsia.  -Fetal growth ultrasound at 32-34 weeks  - other recommendations to be dictated by other comorbidities

## 2024-10-02 DIAGNOSIS — D68.62 LUPUS ANTICOAGULANT AFFECTING PREGNANCY IN SECOND TRIMESTER, ANTEPARTUM: Primary | ICD-10-CM

## 2024-10-02 DIAGNOSIS — O99.112 LUPUS ANTICOAGULANT AFFECTING PREGNANCY IN SECOND TRIMESTER, ANTEPARTUM: Primary | ICD-10-CM

## 2024-10-07 ENCOUNTER — OFFICE VISIT (OUTPATIENT)
Dept: MATERNAL FETAL MEDICINE | Facility: CLINIC | Age: 24
End: 2024-10-07
Payer: MEDICAID

## 2024-10-07 ENCOUNTER — PROCEDURE VISIT (OUTPATIENT)
Dept: MATERNAL FETAL MEDICINE | Facility: CLINIC | Age: 24
End: 2024-10-07
Payer: MEDICAID

## 2024-10-07 VITALS
HEART RATE: 94 BPM | WEIGHT: 129.31 LBS | HEIGHT: 62 IN | DIASTOLIC BLOOD PRESSURE: 84 MMHG | SYSTOLIC BLOOD PRESSURE: 122 MMHG | BODY MASS INDEX: 23.79 KG/M2

## 2024-10-07 DIAGNOSIS — O99.112 LUPUS ANTICOAGULANT AFFECTING PREGNANCY IN SECOND TRIMESTER, ANTEPARTUM: Primary | ICD-10-CM

## 2024-10-07 DIAGNOSIS — D68.62 LUPUS ANTICOAGULANT AFFECTING PREGNANCY IN SECOND TRIMESTER, ANTEPARTUM: ICD-10-CM

## 2024-10-07 DIAGNOSIS — O35.03X0 CHOROID PLEXUS CYST OF FETUS AFFECTING CARE OF MOTHER, ANTEPARTUM, SINGLE OR UNSPECIFIED FETUS: ICD-10-CM

## 2024-10-07 DIAGNOSIS — D68.62 LUPUS ANTICOAGULANT AFFECTING PREGNANCY IN SECOND TRIMESTER, ANTEPARTUM: Primary | ICD-10-CM

## 2024-10-07 DIAGNOSIS — O99.340 ANXIETY ASSOCIATED WITH BIRTHING PROCESS: ICD-10-CM

## 2024-10-07 DIAGNOSIS — F41.9 ANXIETY ASSOCIATED WITH BIRTHING PROCESS: ICD-10-CM

## 2024-10-07 DIAGNOSIS — O09.292 HISTORY OF STILLBIRTH IN CURRENTLY PREGNANT PATIENT, SECOND TRIMESTER: ICD-10-CM

## 2024-10-07 DIAGNOSIS — O99.112 LUPUS ANTICOAGULANT AFFECTING PREGNANCY IN SECOND TRIMESTER, ANTEPARTUM: ICD-10-CM

## 2024-10-07 NOTE — ASSESSMENT & PLAN NOTE
I reviewed the risks of +LAC with the patient.  There may be increased risk of venous thromboembolic disease and stroke.  We discussed the pregnancy risks which include prematurity, IUGR, preeclampsia, and miscarriage/stillbirth.     Recommendations:  We recommend aspirin 81 mg daily at confirmation of pregnancy (she is taking)  Growth ultrasounds are recommended at 28, 32, and 36 weeks due to the risk of FGR  Given the risks of stillbirth, twice weekly antepartum testing at 32 weeks gestation is recommended.    Close surveillance in late second and third trimester for preeclampsia  Recommend prophylactic LMWH during pregnancy and for 6 weeks postpartum (see below)  Prophylaxis Regimen:  -For patients with a BMI =< 40, prescribe lovenox 40 mg daily  -For patients with a BMI > 40, prescribe lovenox 40 mg BID  -UFH dose is 6595-9138 units BID, 7500-40218 units BID, and 60589 units BID in the first, second, and third trimesters, respectively    Peripartum Management  -MATTY hose/SCDs should be used while anticoagulation is interrupted.  -Regardless of whether the patient is on prophylactic dose UFH or LMWH, the last dose should be 12 hours prior to neuraxial anesthesia. For this reason, we no longer recommend conversion to UFH at 36 weeks.     Postpartum Management  -In patients with neuraxial anesthesia: prophylactic anticoagulation should not be restarted within 12 hours of neuraxial blockade or within 4 hours of epidural removal, whichever is later  -Prophylactic anticoagulation (defer to timing related to neuraxial anesthesia)  After vaginal delivery: should be started no sooner than 6 hours  After  delivery: should be started no sooner than 12 hours  -Breastfeeding is compatible with warfarin, UFH, and LMWH.

## 2024-10-07 NOTE — PROGRESS NOTES
"Maternal Fetal Medicine follow up consult    SUBJECTIVE:     Viviane Garnica is a 24 y.o.  female with IUP at 20w6d who is seen in follow up consultation by MFM.  Pregnancy complications include:   Problem   - Choroid plexus cyst of fetus affecting care of mother, antepartum   - Lupus anticoagulant affecting pregnancy in second trimester, antepartum   - History of stillbirth in currently pregnant patient, second trimester   - Anxiety associated with birthing process     Viviane presents for routine follow up appointment.  Continuing Lovenox injections.  Endorses increasing anxiety d/t previous pregnancy loss.  She states that she is okay with me contacting a mental health provider on her behalf.  Denies LOF, VB, contractions. Positive fetal movement.    Previous notes reviewed.   No changes to medical, surgical, family, social, or obstetric history.  Interval history since last MFM visit: see above  Medications reviewed.    Care team members:  Dr DANNY Tate - Primary OB     OBJECTIVE:   /84 (BP Location: Right arm, Patient Position: Sitting)   Pulse 94   Ht 5' 2" (1.575 m)   Wt 58.7 kg (129 lb 4.8 oz)   LMP 2024 (Exact Date)   BMI 23.65 kg/m²     Ultrasound performed. See viewpoint for full ultrasound report.    A viable trent pregnancy is visualized in breech presentation.  Estimated fetal weight is at the 40th percentile with an abdominal circumference at the 65th percentile.   Bilateral choroid plexus cysts are present and anatomic survey is complete. Amniotic fluid volume is normal.  Placenta is posterior.    ASSESSMENT/PLAN:     24 y.o.  female with IUP at 20w6d    - Lupus anticoagulant affecting pregnancy in second trimester, antepartum  I reviewed the risks of +LAC with the patient.  There may be increased risk of venous thromboembolic disease and stroke.  We discussed the pregnancy risks which include prematurity, IUGR, preeclampsia, and miscarriage/stillbirth. "     Recommendations:  We recommend aspirin 81 mg daily at confirmation of pregnancy (she is taking)  Growth ultrasounds are recommended at 28, 32, and 36 weeks due to the risk of FGR  Given the risks of stillbirth, twice weekly antepartum testing at 32 weeks gestation is recommended.    Close surveillance in late second and third trimester for preeclampsia  Recommend prophylactic LMWH during pregnancy and for 6 weeks postpartum (see below)  Prophylaxis Regimen:  -For patients with a BMI =< 40, prescribe lovenox 40 mg daily  -For patients with a BMI > 40, prescribe lovenox 40 mg BID  -UFH dose is 8351-3860 units BID, 7500-06346 units BID, and 50034 units BID in the first, second, and third trimesters, respectively    Peripartum Management  -MATTY hose/SCDs should be used while anticoagulation is interrupted.  -Regardless of whether the patient is on prophylactic dose UFH or LMWH, the last dose should be 12 hours prior to neuraxial anesthesia. For this reason, we no longer recommend conversion to UFH at 36 weeks.     Postpartum Management  -In patients with neuraxial anesthesia: prophylactic anticoagulation should not be restarted within 12 hours of neuraxial blockade or within 4 hours of epidural removal, whichever is later  -Prophylactic anticoagulation (defer to timing related to neuraxial anesthesia)  After vaginal delivery: should be started no sooner than 6 hours  After  delivery: should be started no sooner than 12 hours  -Breastfeeding is compatible with warfarin, UFH, and LMWH.      - History of stillbirth in currently pregnant patient, second trimester  I reviewed the patient's obstetric history which is remarkable for a previous stillbirth at 32 weeks gestation. Maternal medical history is significant for possible partial placental abruption. There is no significant family history. She was found to have lupus anticoagulant positivity x 1 in December. Repeat was negative and beta 2 glycoprotein and  anticardiolipin was negative.   Work-up included:  Genetic testing not done  Antiphospholipid testing (APS):  LAC pos 12/21, repeat neg  B2 glycoprotein IgG/IgM neg  Anticardiolipin IgG/IgM neg  Kleihauer Betke test (or flow cytometry) for fetal-maternal hemorrhage: not done  Type and screen shows no antibodies and RPR neg  Urine toxicology neg  OB Glucose screening was normal  Placental pathology which showed no unusual findings  Autopsy was declined  I counseled the patient regarding the risk for recurrent stillbirth. The recurrence risk depends on the underlying etiology. However, if the cause of fetal death in a low-risk individual was not discovered, the risk of recurrence is estimated to be 7.8 to 10.5 per 1000 births. This pregnancy, she is at risk for FGR and abruption above the population level.     Although she did not have antiphospholipid antibody positivity confirmed on second blood draw, she has an adverse pregnancy event plus LAC+. I recommend starting lovenox prophylaxis (40mg daily) which is associated with reduction in morbidity.     Recommendations:  Serial fetal growth ultrasounds every 4 weeks  Fetal movement awareness, starting at 27-28 weeks  Initiate twice weekly antepartum surveillance at 32 weeks (or 2 weeks prior to gestational age at time of prior stillbirth, but not < 28 weeks) - will start at 30w  Delivery at 38 0/7 - 38 6/7 weeks gestation, unless indicated earlier by maternal or obstetric condition (e.g. diabetes, etc)  Close monitoring for peripartum mood disorders      - Choroid plexus cyst of fetus affecting care of mother, antepartum  The finding of isolated choroid plexus cyst(s) was reviewed with the patient. Choroid plexus cysts are seen in approximately 1 - 3% of normal fetuses and have no functional or clinical significance. They are also seen in approximately 50% of fetuses with trisomy 18; therefore, their primary significance on prenatal sonography is as a potential marker  for trisomy 18. Risk assessment for trisomy 18 includes maternal age, other screening tests for trisomy 18, and the presence of other sonographic abnormalities.     The patient has undergone cell-free DNA testing and the result is low risk for Trisomy 18.     No other sonographic abnormalities were noted on the detailed ultrasound study performed today.  Therefore, the risk for Trisomy 18 in this pregnancy is very low.  Nevertheless, the option of genetic amniocentesis for definitive cytogenetic diagnosis was reviewed, including the risks and benefits of the procedure.  The patient declined amniocentesis.  She was also offered consultation with [facility] counselor and she declines at this time.    10/7/24- Bilateral CPCs remain present.    - Anxiety associated with birthing process  She reports a history of anxiety surrounding her recent pregnancy loss. She may be at increased risk for peripartum and postpartum mood disorders. Precautions provided.      We discussed the usage of medications for anxiety in pregnancy if she feels she may need.     It is important to optimize mental health conditions during pregnancy in an effort to reduce the risk of postpartum mood disorders. There are options for management including psychotherapy, counseling, cognitive behavioral therapy, exercise/yoga, journaling, and psychiatry services.     8/14/24- She has complaints of worsening anxiety/depression given her OB history which is understandable. We have discussed options and she is interested in starting a low dose of Prozac. Rx sent. She will talk to her primary OB to reschedule her appts there to be staggered with our clinic so she can check in with a provider approx every 2 weeks.    9/17/24- she did not start the Prozac after our last appointment.  She states that anxiety is still significant at times.  Today she is tearful we had a long discussion after mentioned of choroid plexus cysts on ultrasound today.  I am  encouraging her to seek counseling with [facility].    10/7/24- Referral to [facility]      F/u in 4 weeks for MFM visit /growth ultrasound    Christine Sanchez MD  Maternal Fetal Medicine

## 2024-10-07 NOTE — ASSESSMENT & PLAN NOTE
She reports a history of anxiety surrounding her recent pregnancy loss. She may be at increased risk for peripartum and postpartum mood disorders. Precautions provided.      We discussed the usage of medications for anxiety in pregnancy if she feels she may need.     It is important to optimize mental health conditions during pregnancy in an effort to reduce the risk of postpartum mood disorders. There are options for management including psychotherapy, counseling, cognitive behavioral therapy, exercise/yoga, journaling, and psychiatry services.     8/14/24- She has complaints of worsening anxiety/depression given her OB history which is understandable. We have discussed options and she is interested in starting a low dose of Prozac. Rx sent. She will talk to her primary OB to reschedule her appts there to be staggered with our clinic so she can check in with a provider approx every 2 weeks.    9/17/24- she did not start the Prozac after our last appointment.  She states that anxiety is still significant at times.  Today she is tearful we had a long discussion after mentioned of choroid plexus cysts on ultrasound today.  I am encouraging her to seek counseling with [facility].    10/7/24- Referral to [facility]

## 2024-10-07 NOTE — ASSESSMENT & PLAN NOTE
The finding of isolated choroid plexus cyst(s) was reviewed with the patient. Choroid plexus cysts are seen in approximately 1 - 3% of normal fetuses and have no functional or clinical significance. They are also seen in approximately 50% of fetuses with trisomy 18; therefore, their primary significance on prenatal sonography is as a potential marker for trisomy 18. Risk assessment for trisomy 18 includes maternal age, other screening tests for trisomy 18, and the presence of other sonographic abnormalities.     The patient has undergone cell-free DNA testing and the result is low risk for Trisomy 18.     No other sonographic abnormalities were noted on the detailed ultrasound study performed today.  Therefore, the risk for Trisomy 18 in this pregnancy is very low.  Nevertheless, the option of genetic amniocentesis for definitive cytogenetic diagnosis was reviewed, including the risks and benefits of the procedure.  The patient declined amniocentesis.  She was also offered consultation with [facility] counselor and she declines at this time.    10/7/24- Bilateral CPCs remain present.

## 2024-10-30 DIAGNOSIS — O99.112 LUPUS ANTICOAGULANT AFFECTING PREGNANCY IN SECOND TRIMESTER, ANTEPARTUM: Primary | ICD-10-CM

## 2024-10-30 DIAGNOSIS — D68.62 LUPUS ANTICOAGULANT AFFECTING PREGNANCY IN SECOND TRIMESTER, ANTEPARTUM: Primary | ICD-10-CM

## 2024-10-30 DIAGNOSIS — O09.292 HISTORY OF STILLBIRTH IN CURRENTLY PREGNANT PATIENT, SECOND TRIMESTER: ICD-10-CM

## 2024-10-31 ENCOUNTER — PATIENT MESSAGE (OUTPATIENT)
Dept: MATERNAL FETAL MEDICINE | Facility: CLINIC | Age: 24
End: 2024-10-31

## 2024-10-31 ENCOUNTER — OFFICE VISIT (OUTPATIENT)
Dept: MATERNAL FETAL MEDICINE | Facility: CLINIC | Age: 24
End: 2024-10-31
Payer: MEDICAID

## 2024-10-31 ENCOUNTER — PROCEDURE VISIT (OUTPATIENT)
Dept: MATERNAL FETAL MEDICINE | Facility: CLINIC | Age: 24
End: 2024-10-31
Payer: MEDICAID

## 2024-10-31 VITALS
HEART RATE: 94 BPM | WEIGHT: 133.06 LBS | SYSTOLIC BLOOD PRESSURE: 121 MMHG | HEIGHT: 62 IN | BODY MASS INDEX: 24.48 KG/M2 | DIASTOLIC BLOOD PRESSURE: 85 MMHG

## 2024-10-31 DIAGNOSIS — O35.03X0 CHOROID PLEXUS CYST OF FETUS AFFECTING CARE OF MOTHER, ANTEPARTUM, SINGLE OR UNSPECIFIED FETUS: ICD-10-CM

## 2024-10-31 DIAGNOSIS — D68.62 LUPUS ANTICOAGULANT AFFECTING PREGNANCY IN SECOND TRIMESTER, ANTEPARTUM: ICD-10-CM

## 2024-10-31 DIAGNOSIS — O99.340 ANXIETY ASSOCIATED WITH BIRTHING PROCESS: ICD-10-CM

## 2024-10-31 DIAGNOSIS — O09.292 HISTORY OF STILLBIRTH IN CURRENTLY PREGNANT PATIENT, SECOND TRIMESTER: ICD-10-CM

## 2024-10-31 DIAGNOSIS — O99.112 LUPUS ANTICOAGULANT AFFECTING PREGNANCY IN SECOND TRIMESTER, ANTEPARTUM: ICD-10-CM

## 2024-10-31 DIAGNOSIS — D68.62 LUPUS ANTICOAGULANT AFFECTING PREGNANCY IN SECOND TRIMESTER, ANTEPARTUM: Primary | ICD-10-CM

## 2024-10-31 DIAGNOSIS — F41.9 ANXIETY ASSOCIATED WITH BIRTHING PROCESS: ICD-10-CM

## 2024-10-31 DIAGNOSIS — O99.112 LUPUS ANTICOAGULANT AFFECTING PREGNANCY IN SECOND TRIMESTER, ANTEPARTUM: Primary | ICD-10-CM

## 2024-10-31 DIAGNOSIS — O09.292 HISTORY OF STILLBIRTH IN CURRENTLY PREGNANT PATIENT, SECOND TRIMESTER: Primary | ICD-10-CM

## 2024-10-31 RX ORDER — ASCORBIC ACID 250 MG
250 TABLET ORAL DAILY
COMMUNITY

## 2024-11-15 ENCOUNTER — LAB VISIT (OUTPATIENT)
Dept: LAB | Facility: HOSPITAL | Age: 24
End: 2024-11-15
Attending: OBSTETRICS & GYNECOLOGY
Payer: MEDICAID

## 2024-11-15 DIAGNOSIS — Z34.82 PRENATAL CARE, SUBSEQUENT PREGNANCY IN SECOND TRIMESTER: Primary | ICD-10-CM

## 2024-11-15 LAB
ERYTHROCYTE [DISTWIDTH] IN BLOOD BY AUTOMATED COUNT: 12.8 % (ref 11.5–17)
GLUCOSE 1H P 100 G GLC PO SERPL-MCNC: 144 MG/DL (ref 100–180)
HCT VFR BLD AUTO: 32.5 % (ref 37–47)
HGB BLD-MCNC: 11.3 G/DL (ref 12–16)
MCH RBC QN AUTO: 32.4 PG (ref 27–31)
MCHC RBC AUTO-ENTMCNC: 34.8 G/DL (ref 33–36)
MCV RBC AUTO: 93.1 FL (ref 80–94)
NRBC BLD AUTO-RTO: 0 %
PLATELET # BLD AUTO: 217 X10(3)/MCL (ref 130–400)
PMV BLD AUTO: 9.4 FL (ref 7.4–10.4)
RBC # BLD AUTO: 3.49 X10(6)/MCL (ref 4.2–5.4)
WBC # BLD AUTO: 7.07 X10(3)/MCL (ref 4.5–11.5)

## 2024-11-15 PROCEDURE — 36415 COLL VENOUS BLD VENIPUNCTURE: CPT

## 2024-11-15 PROCEDURE — 85027 COMPLETE CBC AUTOMATED: CPT

## 2024-11-15 PROCEDURE — 82950 GLUCOSE TEST: CPT

## 2024-11-18 ENCOUNTER — PATIENT MESSAGE (OUTPATIENT)
Dept: MATERNAL FETAL MEDICINE | Facility: CLINIC | Age: 24
End: 2024-11-18
Payer: MEDICAID

## 2024-11-27 ENCOUNTER — PROCEDURE VISIT (OUTPATIENT)
Dept: MATERNAL FETAL MEDICINE | Facility: CLINIC | Age: 24
End: 2024-11-27
Payer: MEDICAID

## 2024-11-27 ENCOUNTER — OFFICE VISIT (OUTPATIENT)
Dept: MATERNAL FETAL MEDICINE | Facility: CLINIC | Age: 24
End: 2024-11-27
Payer: MEDICAID

## 2024-11-27 VITALS
HEIGHT: 62 IN | WEIGHT: 140.75 LBS | DIASTOLIC BLOOD PRESSURE: 85 MMHG | HEART RATE: 106 BPM | SYSTOLIC BLOOD PRESSURE: 130 MMHG | BODY MASS INDEX: 25.9 KG/M2

## 2024-11-27 DIAGNOSIS — D68.62 LUPUS ANTICOAGULANT AFFECTING PREGNANCY IN THIRD TRIMESTER, ANTEPARTUM: Primary | ICD-10-CM

## 2024-11-27 DIAGNOSIS — F41.9 ANXIETY ASSOCIATED WITH BIRTHING PROCESS: ICD-10-CM

## 2024-11-27 DIAGNOSIS — Z87.59 HISTORY OF IUFD: ICD-10-CM

## 2024-11-27 DIAGNOSIS — O09.292 HISTORY OF STILLBIRTH IN CURRENTLY PREGNANT PATIENT, SECOND TRIMESTER: ICD-10-CM

## 2024-11-27 DIAGNOSIS — O99.113 LUPUS ANTICOAGULANT AFFECTING PREGNANCY IN THIRD TRIMESTER, ANTEPARTUM: Primary | ICD-10-CM

## 2024-11-27 DIAGNOSIS — O99.112 LUPUS ANTICOAGULANT AFFECTING PREGNANCY IN SECOND TRIMESTER, ANTEPARTUM: ICD-10-CM

## 2024-11-27 DIAGNOSIS — O09.293 HISTORY OF STILLBIRTH IN PREGNANT PATIENT IN THIRD TRIMESTER, ANTEPARTUM: ICD-10-CM

## 2024-11-27 DIAGNOSIS — D68.62 LUPUS ANTICOAGULANT AFFECTING PREGNANCY IN SECOND TRIMESTER, ANTEPARTUM: ICD-10-CM

## 2024-11-27 DIAGNOSIS — O99.340 ANXIETY ASSOCIATED WITH BIRTHING PROCESS: ICD-10-CM

## 2024-11-27 PROBLEM — O35.03X0 CHOROID PLEXUS CYST OF FETUS AFFECTING CARE OF MOTHER, ANTEPARTUM: Status: RESOLVED | Noted: 2024-09-17 | Resolved: 2024-11-27

## 2024-11-27 RX ORDER — LANOLIN ALCOHOL/MO/W.PET/CERES
1 CREAM (GRAM) TOPICAL
COMMUNITY

## 2024-11-27 NOTE — PROGRESS NOTES
"Maternal Fetal Medicine follow up consult    SUBJECTIVE:     Viviane Garnica is a 24 y.o.  female with IUP at 28w1d who is seen in follow up consultation by M.  Pregnancy complications include:   Problem   - Lupus anticoagulant affecting pregnancy in third trimester, antepartum   - History of stillbirth in pregnant patient in third trimester, antepartum   - Anxiety associated with birthing process   - Choroid plexus cyst of fetus affecting care of mother, antepartum (Resolved)     Viviane presents for routine follow up appointment.  Denies LOF, VB, contractions. Positive fetal movement.  She went to [facility] for a visit with Christine Landrum and says she felt more panicky during and after the visit. I expressed that sometimes getting started takes time and can bring up old emotions but encouraged her to do what she felt was right. If too much, she might not be ready.   Glucola result was 144. She's asking if she should do 3 HR OGTT. I told her the chance of GDM is highly unlikely but she can do 3HR OGTT if she wants (her OB told her not needed). She elects to check [facility] for a week and will send them (she has supplies).    Previous notes reviewed.   No changes to medical, surgical, family, social, or obstetric history.  Interval history since last M visit: see above  Medications reviewed.    Care team members:  Dr DANNY Tate - Primary OB     OBJECTIVE:   /85 (BP Location: Right arm, Patient Position: Sitting)   Pulse 106   Ht 5' 2" (1.575 m)   Wt 63.9 kg (140 lb 12.2 oz)   LMP 2024 (Exact Date)   BMI 25.75 kg/m²     Ultrasound performed. See viewpoint for full ultrasound report.    A viable trent pregnancy is visualized in breech presentation.  Estimated fetal weight is at the 49th percentile with an abdominal circumference at the 54th percentile.    No fetal abnormalities are noted and previous anatomic survey was normal. Amniotic fluid volume is normal.  Placenta is " posterior.    ASSESSMENT/PLAN:     24 y.o.  female with IUP at 28w1d    - Lupus anticoagulant affecting pregnancy in third trimester, antepartum  I reviewed the risks of +LAC with the patient.  There may be increased risk of venous thromboembolic disease and stroke.  We discussed the pregnancy risks which include prematurity, IUGR, preeclampsia, and miscarriage/stillbirth.     Recommendations:  We recommend aspirin 81 mg daily at confirmation of pregnancy (she is taking)  Growth ultrasounds are recommended at 28, 32, and 36 weeks due to the risk of FGR  Given the risks of stillbirth, twice weekly antepartum testing at 32 weeks gestation is recommended.    Close surveillance in late second and third trimester for preeclampsia  Recommend prophylactic LMWH during pregnancy and for 6 weeks postpartum (see below)  Prophylaxis Regimen:  -For patients with a BMI =< 40, prescribe lovenox 40 mg daily  -For patients with a BMI > 40, prescribe lovenox 40 mg BID  -UFH dose is 8719-4044 units BID, 7500-59113 units BID, and 82161 units BID in the first, second, and third trimesters, respectively    Peripartum Management  -MATTY hose/SCDs should be used while anticoagulation is interrupted.  -Regardless of whether the patient is on prophylactic dose UFH or LMWH, the last dose should be 12 hours prior to neuraxial anesthesia. For this reason, we no longer recommend conversion to UFH at 36 weeks.     Postpartum Management  -In patients with neuraxial anesthesia: prophylactic anticoagulation should not be restarted within 12 hours of neuraxial blockade or within 4 hours of epidural removal, whichever is later  -Prophylactic anticoagulation (defer to timing related to neuraxial anesthesia)  After vaginal delivery: should be started no sooner than 6 hours  After  delivery: should be started no sooner than 12 hours  -Breastfeeding is compatible with warfarin, UFH, and LMWH.      - Anxiety associated with birthing  process  She reports a history of anxiety surrounding her recent pregnancy loss. She may be at increased risk for peripartum and postpartum mood disorders. Precautions provided.      We discussed the usage of medications for anxiety in pregnancy if she feels she may need.     It is important to optimize mental health conditions during pregnancy in an effort to reduce the risk of postpartum mood disorders. There are options for management including psychotherapy, counseling, cognitive behavioral therapy, exercise/yoga, journaling, and psychiatry services.     8/14/24- She has complaints of worsening anxiety/depression given her OB history which is understandable. We have discussed options and she is interested in starting a low dose of Prozac. Rx sent. She will talk to her primary OB to reschedule her appts there to be staggered with our clinic so she can check in with a provider approx every 2 weeks.    9/17/24- she did not start the Prozac after our last appointment.  She states that anxiety is still significant at times.  Today she is tearful we had a long discussion after mentioned of choroid plexus cysts on ultrasound today.  I am encouraging her to seek counseling with [facility].    10/7/24- Referral to [facility]    10/31/24- She has contacted [facility], however, has not made an appt. We have encouraged the utilization of counseling/therapy to help with grief.     11/26- Went to a visit with therapist. She did not feel relief after the visit. I encouraged her to continue if she can but expressed for her to do what was right for her. We discussed increased symptoms likely to happen as we approach 34 weeks. Starting early antepartum testing.       - History of stillbirth in pregnant patient in third trimester, antepartum  I reviewed the patient's obstetric history which is remarkable for a previous stillbirth at 32 weeks gestation. Maternal medical history is significant for possible partial  placental abruption. There is no significant family history. She was found to have lupus anticoagulant positivity x 1 in December. Repeat was negative and beta 2 glycoprotein and anticardiolipin was negative.   Work-up included:  Genetic testing not done  Antiphospholipid testing (APS):  LAC pos 12/21, repeat neg  B2 glycoprotein IgG/IgM neg  Anticardiolipin IgG/IgM neg  Kleihauer Betke test (or flow cytometry) for fetal-maternal hemorrhage: not done  Type and screen shows no antibodies and RPR neg  Urine toxicology neg  OB Glucose screening was normal  Placental pathology which showed no unusual findings  Autopsy was declined  I counseled the patient regarding the risk for recurrent stillbirth. The recurrence risk depends on the underlying etiology. However, if the cause of fetal death in a low-risk individual was not discovered, the risk of recurrence is estimated to be 7.8 to 10.5 per 1000 births. This pregnancy, she is at risk for FGR and abruption above the population level.     Although she did not have antiphospholipid antibody positivity confirmed on second blood draw, she has an adverse pregnancy event plus LAC+. I recommend starting lovenox prophylaxis (40mg daily) which is associated with reduction in morbidity.     Recommendations:  Serial fetal growth ultrasounds every 4 weeks  Fetal movement awareness, starting at 27-28 weeks  Initiate twice weekly antepartum surveillance at 30 weeks (NSTs on Mondays w/ OB and BPPs on Thursdays w/ MFM)  Due to severe maternal anxiety, will plan for delivery at 37 weeks  Close monitoring for peripartum mood disorders      F/u in 2 weeks to begin once weekly BPPs  F/u in 4 weeks for MFM visit /growth ultrasound    Christine Sanchez MD  Maternal Fetal Medicine

## 2024-11-27 NOTE — ASSESSMENT & PLAN NOTE
I reviewed the patient's obstetric history which is remarkable for a previous stillbirth at 32 weeks gestation. Maternal medical history is significant for possible partial placental abruption. There is no significant family history. She was found to have lupus anticoagulant positivity x 1 in December. Repeat was negative and beta 2 glycoprotein and anticardiolipin was negative.   Work-up included:  Genetic testing not done  Antiphospholipid testing (APS):  LAC pos 12/21, repeat neg  B2 glycoprotein IgG/IgM neg  Anticardiolipin IgG/IgM neg  Kleihauer Betke test (or flow cytometry) for fetal-maternal hemorrhage: not done  Type and screen shows no antibodies and RPR neg  Urine toxicology neg  OB Glucose screening was normal  Placental pathology which showed no unusual findings  Autopsy was declined  I counseled the patient regarding the risk for recurrent stillbirth. The recurrence risk depends on the underlying etiology. However, if the cause of fetal death in a low-risk individual was not discovered, the risk of recurrence is estimated to be 7.8 to 10.5 per 1000 births. This pregnancy, she is at risk for FGR and abruption above the population level.     Although she did not have antiphospholipid antibody positivity confirmed on second blood draw, she has an adverse pregnancy event plus LAC+. I recommend starting lovenox prophylaxis (40mg daily) which is associated with reduction in morbidity.     Recommendations:  Serial fetal growth ultrasounds every 4 weeks  Fetal movement awareness, starting at 27-28 weeks  Initiate twice weekly antepartum surveillance at 30 weeks (NSTs on Mondays w/ OB and BPPs on Thursdays w/ MFM)  Due to severe maternal anxiety, will plan for delivery at 37 weeks  Close monitoring for peripartum mood disorders

## 2024-11-27 NOTE — ASSESSMENT & PLAN NOTE
ASTHMA ACTION PLAN for Jacinto Buenrostro     : 1961     Date: 3/14/2018  Provider:  Ayden Parham MD  Phone for doctor or clinic: [facility], [address] [facility], [address] [address] ,[address]  [address] [place] [address] She reports a history of anxiety surrounding her recent pregnancy loss. She may be at increased risk for peripartum and postpartum mood disorders. Precautions provided.      We discussed the usage of medications for anxiety in pregnancy if she feels she may need.     It is important to optimize mental health conditions during pregnancy in an effort to reduce the risk of postpartum mood disorders. There are options for management including psychotherapy, counseling, cognitive behavioral therapy, exercise/yoga, journaling, and psychiatry services.     8/14/24- She has complaints of worsening anxiety/depression given her OB history which is understandable. We have discussed options and she is interested in starting a low dose of Prozac. Rx sent. She will talk to her primary OB to reschedule her appts there to be staggered with our clinic so she can check in with a provider approx every 2 weeks.    9/17/24- she did not start the Prozac after our last appointment.  She states that anxiety is still significant at times.  Today she is tearful we had a long discussion after mentioned of choroid plexus cysts on ultrasound today.  I am encouraging her to seek counseling with [facility].    10/7/24- Referral to [facility]    10/31/24- She has contacted [facility], however, has not made an appt. We have encouraged the utilization of counseling/therapy to help with grief.     11/26- Went to a visit with therapist. She did not feel relief after the visit. I encouraged her to continue if she can but expressed for her to do what was right for her. We discussed increased symptoms likely to happen as we approach 34 weeks. Starting early antepartum testing.

## 2024-11-27 NOTE — ASSESSMENT & PLAN NOTE
I reviewed the risks of +LAC with the patient.  There may be increased risk of venous thromboembolic disease and stroke.  We discussed the pregnancy risks which include prematurity, IUGR, preeclampsia, and miscarriage/stillbirth.     Recommendations:  We recommend aspirin 81 mg daily at confirmation of pregnancy (she is taking)  Growth ultrasounds are recommended at 28, 32, and 36 weeks due to the risk of FGR  Given the risks of stillbirth, twice weekly antepartum testing at 32 weeks gestation is recommended.    Close surveillance in late second and third trimester for preeclampsia  Recommend prophylactic LMWH during pregnancy and for 6 weeks postpartum (see below)  Prophylaxis Regimen:  -For patients with a BMI =< 40, prescribe lovenox 40 mg daily  -For patients with a BMI > 40, prescribe lovenox 40 mg BID  -UFH dose is 3389-3793 units BID, 7500-80006 units BID, and 24520 units BID in the first, second, and third trimesters, respectively    Peripartum Management  -MATTY hose/SCDs should be used while anticoagulation is interrupted.  -Regardless of whether the patient is on prophylactic dose UFH or LMWH, the last dose should be 12 hours prior to neuraxial anesthesia. For this reason, we no longer recommend conversion to UFH at 36 weeks.     Postpartum Management  -In patients with neuraxial anesthesia: prophylactic anticoagulation should not be restarted within 12 hours of neuraxial blockade or within 4 hours of epidural removal, whichever is later  -Prophylactic anticoagulation (defer to timing related to neuraxial anesthesia)  After vaginal delivery: should be started no sooner than 6 hours  After  delivery: should be started no sooner than 12 hours  -Breastfeeding is compatible with warfarin, UFH, and LMWH.

## 2024-12-12 ENCOUNTER — PROCEDURE VISIT (OUTPATIENT)
Dept: MATERNAL FETAL MEDICINE | Facility: CLINIC | Age: 24
End: 2024-12-12
Payer: MEDICAID

## 2024-12-12 VITALS — SYSTOLIC BLOOD PRESSURE: 135 MMHG | DIASTOLIC BLOOD PRESSURE: 82 MMHG | HEART RATE: 106 BPM

## 2024-12-12 DIAGNOSIS — Z87.59 HISTORY OF IUFD: ICD-10-CM

## 2024-12-12 DIAGNOSIS — O99.113 LUPUS ANTICOAGULANT AFFECTING PREGNANCY IN THIRD TRIMESTER, ANTEPARTUM: ICD-10-CM

## 2024-12-12 DIAGNOSIS — D68.62 LUPUS ANTICOAGULANT AFFECTING PREGNANCY IN THIRD TRIMESTER, ANTEPARTUM: ICD-10-CM

## 2024-12-12 PROCEDURE — 76819 FETAL BIOPHYS PROFIL W/O NST: CPT | Mod: S$GLB,,, | Performed by: OBSTETRICS & GYNECOLOGY

## 2024-12-16 ENCOUNTER — APPOINTMENT (OUTPATIENT)
Dept: LAB | Facility: HOSPITAL | Age: 24
End: 2024-12-16
Attending: OBSTETRICS & GYNECOLOGY
Payer: MEDICAID

## 2024-12-16 DIAGNOSIS — D64.9 ANEMIA, UNSPECIFIED TYPE: Primary | ICD-10-CM

## 2024-12-16 LAB — FERRITIN SERPL-MCNC: 15.54 NG/ML (ref 4.63–204)

## 2024-12-16 PROCEDURE — 36415 COLL VENOUS BLD VENIPUNCTURE: CPT

## 2024-12-16 PROCEDURE — 82728 ASSAY OF FERRITIN: CPT

## 2024-12-19 ENCOUNTER — OFFICE VISIT (OUTPATIENT)
Dept: MATERNAL FETAL MEDICINE | Facility: CLINIC | Age: 24
End: 2024-12-19
Payer: MEDICAID

## 2024-12-19 ENCOUNTER — LAB VISIT (OUTPATIENT)
Dept: LAB | Facility: HOSPITAL | Age: 24
End: 2024-12-19
Attending: NURSE PRACTITIONER
Payer: MEDICAID

## 2024-12-19 ENCOUNTER — TELEPHONE (OUTPATIENT)
Dept: MATERNAL FETAL MEDICINE | Facility: CLINIC | Age: 24
End: 2024-12-19

## 2024-12-19 ENCOUNTER — PROCEDURE VISIT (OUTPATIENT)
Dept: MATERNAL FETAL MEDICINE | Facility: CLINIC | Age: 24
End: 2024-12-19
Payer: MEDICAID

## 2024-12-19 VITALS — DIASTOLIC BLOOD PRESSURE: 91 MMHG | SYSTOLIC BLOOD PRESSURE: 136 MMHG | HEART RATE: 105 BPM

## 2024-12-19 DIAGNOSIS — F41.9 ANXIETY ASSOCIATED WITH BIRTHING PROCESS: ICD-10-CM

## 2024-12-19 DIAGNOSIS — D68.62 LUPUS ANTICOAGULANT AFFECTING PREGNANCY IN THIRD TRIMESTER, ANTEPARTUM: ICD-10-CM

## 2024-12-19 DIAGNOSIS — O99.113 LUPUS ANTICOAGULANT AFFECTING PREGNANCY IN THIRD TRIMESTER, ANTEPARTUM: ICD-10-CM

## 2024-12-19 DIAGNOSIS — O99.340 ANXIETY ASSOCIATED WITH BIRTHING PROCESS: ICD-10-CM

## 2024-12-19 DIAGNOSIS — O09.293 HISTORY OF STILLBIRTH IN PREGNANT PATIENT IN THIRD TRIMESTER, ANTEPARTUM: ICD-10-CM

## 2024-12-19 DIAGNOSIS — R03.0 ELEVATED BLOOD PRESSURE READING IN OFFICE WITHOUT DIAGNOSIS OF HYPERTENSION: ICD-10-CM

## 2024-12-19 DIAGNOSIS — O16.3 ELEVATED BLOOD PRESSURE AFFECTING PREGNANCY IN THIRD TRIMESTER, ANTEPARTUM: Primary | ICD-10-CM

## 2024-12-19 DIAGNOSIS — R03.0 ELEVATED BLOOD PRESSURE READING IN OFFICE WITHOUT DIAGNOSIS OF HYPERTENSION: Primary | ICD-10-CM

## 2024-12-19 DIAGNOSIS — Z87.59 HISTORY OF IUFD: ICD-10-CM

## 2024-12-19 LAB
ALBUMIN SERPL-MCNC: 3 G/DL (ref 3.5–5)
ALBUMIN/GLOB SERPL: 1.1 RATIO (ref 1.1–2)
ALP SERPL-CCNC: 87 UNIT/L (ref 40–150)
ALT SERPL-CCNC: 15 UNIT/L (ref 0–55)
ANION GAP SERPL CALC-SCNC: 7 MEQ/L
AST SERPL-CCNC: 18 UNIT/L (ref 5–34)
BASOPHILS # BLD AUTO: 0.03 X10(3)/MCL
BASOPHILS NFR BLD AUTO: 0.4 %
BILIRUB SERPL-MCNC: 0.4 MG/DL
BUN SERPL-MCNC: 7.3 MG/DL (ref 7–18.7)
CALCIUM SERPL-MCNC: 9.3 MG/DL (ref 8.4–10.2)
CHLORIDE SERPL-SCNC: 108 MMOL/L (ref 98–107)
CO2 SERPL-SCNC: 25 MMOL/L (ref 22–29)
CREAT SERPL-MCNC: 0.53 MG/DL (ref 0.55–1.02)
CREAT UR-MCNC: 16.2 MG/DL (ref 45–106)
CREAT/UREA NIT SERPL: 14
EOSINOPHIL # BLD AUTO: 0.12 X10(3)/MCL (ref 0–0.9)
EOSINOPHIL NFR BLD AUTO: 1.8 %
ERYTHROCYTE [DISTWIDTH] IN BLOOD BY AUTOMATED COUNT: 13.3 % (ref 11.5–17)
GFR SERPLBLD CREATININE-BSD FMLA CKD-EPI: >60 ML/MIN/1.73/M2
GLOBULIN SER-MCNC: 2.7 GM/DL (ref 2.4–3.5)
GLUCOSE SERPL-MCNC: 87 MG/DL (ref 74–100)
HCT VFR BLD AUTO: 33.1 % (ref 37–47)
HGB BLD-MCNC: 11.7 G/DL (ref 12–16)
IMM GRANULOCYTES # BLD AUTO: 0.07 X10(3)/MCL (ref 0–0.04)
IMM GRANULOCYTES NFR BLD AUTO: 1 %
LDH SERPL-CCNC: 152 U/L (ref 125–220)
LYMPHOCYTES # BLD AUTO: 1.7 X10(3)/MCL (ref 0.6–4.6)
LYMPHOCYTES NFR BLD AUTO: 25 %
MCH RBC QN AUTO: 33.1 PG (ref 27–31)
MCHC RBC AUTO-ENTMCNC: 35.3 G/DL (ref 33–36)
MCV RBC AUTO: 93.8 FL (ref 80–94)
MONOCYTES # BLD AUTO: 0.59 X10(3)/MCL (ref 0.1–1.3)
MONOCYTES NFR BLD AUTO: 8.7 %
NEUTROPHILS # BLD AUTO: 4.3 X10(3)/MCL (ref 2.1–9.2)
NEUTROPHILS NFR BLD AUTO: 63.1 %
NRBC BLD AUTO-RTO: 0 %
PLATELET # BLD AUTO: 214 X10(3)/MCL (ref 130–400)
PMV BLD AUTO: 9.2 FL (ref 7.4–10.4)
POTASSIUM SERPL-SCNC: 3.6 MMOL/L (ref 3.5–5.1)
PROT SERPL-MCNC: 5.7 GM/DL (ref 6.4–8.3)
PROT UR STRIP-MCNC: <6.8 MG/DL
RBC # BLD AUTO: 3.53 X10(6)/MCL (ref 4.2–5.4)
SODIUM SERPL-SCNC: 140 MMOL/L (ref 136–145)
URATE SERPL-MCNC: 3.8 MG/DL (ref 2.6–6)
WBC # BLD AUTO: 6.81 X10(3)/MCL (ref 4.5–11.5)

## 2024-12-19 PROCEDURE — 1159F MED LIST DOCD IN RCRD: CPT | Mod: CPTII,S$GLB,, | Performed by: NURSE PRACTITIONER

## 2024-12-19 PROCEDURE — 84550 ASSAY OF BLOOD/URIC ACID: CPT

## 2024-12-19 PROCEDURE — 76819 FETAL BIOPHYS PROFIL W/O NST: CPT | Mod: S$GLB,,, | Performed by: OBSTETRICS & GYNECOLOGY

## 2024-12-19 PROCEDURE — 85025 COMPLETE CBC W/AUTO DIFF WBC: CPT

## 2024-12-19 PROCEDURE — 82570 ASSAY OF URINE CREATININE: CPT

## 2024-12-19 PROCEDURE — 99214 OFFICE O/P EST MOD 30 MIN: CPT | Mod: TH,S$GLB,, | Performed by: NURSE PRACTITIONER

## 2024-12-19 PROCEDURE — 80053 COMPREHEN METABOLIC PANEL: CPT

## 2024-12-19 PROCEDURE — 83615 LACTATE (LD) (LDH) ENZYME: CPT

## 2024-12-19 PROCEDURE — 1160F RVW MEDS BY RX/DR IN RCRD: CPT | Mod: CPTII,S$GLB,, | Performed by: NURSE PRACTITIONER

## 2024-12-19 PROCEDURE — 36415 COLL VENOUS BLD VENIPUNCTURE: CPT

## 2024-12-19 NOTE — PROGRESS NOTES
Maternal Fetal Medicine follow up consult    SUBJECTIVE:     Viviane Garnica is a 24 y.o.  female with IUP at 31w2d who is seen in follow up consultation by MFM.  Pregnancy complications include:   Problem   - Elevated blood pressure affecting pregnancy in third trimester, antepartum   - Lupus anticoagulant affecting pregnancy in third trimester, antepartum   - History of stillbirth in pregnant patient in third trimester, antepartum   - Anxiety associated with birthing process     Viviane presents for routine antepartum testing.  Bps mildly elevated. Recent onset of ankle edema. Denies neuro s/s.  Denies LOF, VB, contractions. Positive fetal movement.    Previous notes reviewed.   No changes to medical, surgical, family, social, or obstetric history.  Interval history since last MFM visit: see above  Medications reviewed.    Care team members:  Dr DANNY Tate - Primary OB     OBJECTIVE:   Gestational Age: 31w2d  /87, 136/91. Pulse 105    Ultrasound performed. See viewpoint for full ultrasound report.    ASSESSMENT/PLAN:     24 y.o.  female with IUP at 31w2d    - Elevated blood pressure affecting pregnancy in third trimester, antepartum  Today, she reports for antepartum testing. Initial /57. Repeat /91.   She states she has a recent onset of ankle edema. 1+ pitting edema to bilat LE noted. Denies headaches, RUQ pain. Initially, she reported seeing spots occ (last episode approx 2 weeks ago). Upon further questioning, she experienced these visual disturbances when she changed positions quickly (sitting to standing) and also felt light-headed at the same time. After a few seconds, all symptoms abated. Likely orthostatic hypotension. Educated on warning s/s and when to report to the hospital.  She will start checking her Bps at home and submit a log on Monday for review.  Pre-e labs ordered - will obtain today.    - Lupus anticoagulant affecting pregnancy in third trimester,  antepartum  I reviewed the risks of +LAC with the patient.  There may be increased risk of venous thromboembolic disease and stroke.  We discussed the pregnancy risks which include prematurity, IUGR, preeclampsia, and miscarriage/stillbirth.     Recommendations:  We recommend aspirin 81 mg daily at confirmation of pregnancy (she is taking)  Growth ultrasounds are recommended at 28, 32, and 36 weeks due to the risk of FGR  Given the risks of stillbirth, twice weekly antepartum testing at 32 weeks gestation is recommended.    Close surveillance in late second and third trimester for preeclampsia  Recommend prophylactic LMWH during pregnancy and for 6 weeks postpartum (see below)  Prophylaxis Regimen:  -For patients with a BMI =< 40, prescribe lovenox 40 mg daily  -For patients with a BMI > 40, prescribe lovenox 40 mg BID  -UFH dose is 8418-9948 units BID, 7500-19075 units BID, and 49792 units BID in the first, second, and third trimesters, respectively    Peripartum Management  -MATTY hose/SCDs should be used while anticoagulation is interrupted.  -Regardless of whether the patient is on prophylactic dose UFH or LMWH, the last dose should be 12 hours prior to neuraxial anesthesia. For this reason, we no longer recommend conversion to UFH at 36 weeks.     Postpartum Management  -In patients with neuraxial anesthesia: prophylactic anticoagulation should not be restarted within 12 hours of neuraxial blockade or within 4 hours of epidural removal, whichever is later  -Prophylactic anticoagulation (defer to timing related to neuraxial anesthesia)  After vaginal delivery: should be started no sooner than 6 hours  After  delivery: should be started no sooner than 12 hours  -Breastfeeding is compatible with warfarin, UFH, and LMWH.      - Anxiety associated with birthing process  She reports a history of anxiety surrounding her recent pregnancy loss. She may be at increased risk for peripartum and postpartum mood  disorders. Precautions provided.      We discussed the usage of medications for anxiety in pregnancy if she feels she may need.     It is important to optimize mental health conditions during pregnancy in an effort to reduce the risk of postpartum mood disorders. There are options for management including psychotherapy, counseling, cognitive behavioral therapy, exercise/yoga, journaling, and psychiatry services.     8/14/24- She has complaints of worsening anxiety/depression given her OB history which is understandable. We have discussed options and she is interested in starting a low dose of Prozac. Rx sent. She will talk to her primary OB to reschedule her appts there to be staggered with our clinic so she can check in with a provider approx every 2 weeks.    9/17/24- she did not start the Prozac after our last appointment.  She states that anxiety is still significant at times.  Today she is tearful we had a long discussion after mentioned of choroid plexus cysts on ultrasound today.  I am encouraging her to seek counseling with [facility].    10/7/24- Referral to [facility]    10/31/24- She has contacted [facility], however, has not made an appt. We have encouraged the utilization of counseling/therapy to help with grief.     11/26- Went to a visit with therapist. She did not feel relief after the visit. I encouraged her to continue if she can but expressed for her to do what was right for her. We discussed increased symptoms likely to happen as we approach 34 weeks. Starting early antepartum testing.       - History of stillbirth in pregnant patient in third trimester, antepartum  I reviewed the patient's obstetric history which is remarkable for a previous stillbirth at 32 weeks gestation. Maternal medical history is significant for possible partial placental abruption. There is no significant family history. She was found to have lupus anticoagulant positivity x 1 in December. Repeat was  negative and beta 2 glycoprotein and anticardiolipin was negative.   Work-up included:  Genetic testing not done  Antiphospholipid testing (APS):  LAC pos 12/21, repeat neg  B2 glycoprotein IgG/IgM neg  Anticardiolipin IgG/IgM neg  Kleihauer Betke test (or flow cytometry) for fetal-maternal hemorrhage: not done  Type and screen shows no antibodies and RPR neg  Urine toxicology neg  OB Glucose screening was normal  Placental pathology which showed no unusual findings  Autopsy was declined  I counseled the patient regarding the risk for recurrent stillbirth. The recurrence risk depends on the underlying etiology. However, if the cause of fetal death in a low-risk individual was not discovered, the risk of recurrence is estimated to be 7.8 to 10.5 per 1000 births. This pregnancy, she is at risk for FGR and abruption above the population level.     Although she did not have antiphospholipid antibody positivity confirmed on second blood draw, she has an adverse pregnancy event plus LAC+. I recommend starting lovenox prophylaxis (40mg daily) which is associated with reduction in morbidity.     Recommendations:  Serial fetal growth ultrasounds every 4 weeks  Fetal movement awareness, starting at 27-28 weeks  Initiate twice weekly antepartum surveillance at 30 weeks (NSTs on Mondays w/ OB and BPPs on Thursdays w/ MFM)  Due to severe maternal anxiety, will plan for delivery at 37 weeks  Close monitoring for peripartum mood disorders      F/u in 1 week

## 2024-12-19 NOTE — ASSESSMENT & PLAN NOTE
She reports a history of anxiety surrounding her recent pregnancy loss. She may be at increased risk for peripartum and postpartum mood disorders. Precautions provided.      We discussed the usage of medications for anxiety in pregnancy if she feels she may need.     It is important to optimize mental health conditions during pregnancy in an effort to reduce the risk of postpartum mood disorders. There are options for management including psychotherapy, counseling, cognitive behavioral therapy, exercise/yoga, journaling, and psychiatry services.     8/14/24- She has complaints of worsening anxiety/depression given her OB history which is understandable. We have discussed options and she is interested in starting a low dose of Prozac. Rx sent. She will talk to her primary OB to reschedule her appts there to be staggered with our clinic so she can check in with a provider approx every 2 weeks.    9/17/24- she did not start the Prozac after our last appointment.  She states that anxiety is still significant at times.  Today she is tearful we had a long discussion after mentioned of choroid plexus cysts on ultrasound today.  I am encouraging her to seek counseling with [facility].    10/7/24- Referral to [facility]    10/31/24- She has contacted [facility], however, has not made an appt. We have encouraged the utilization of counseling/therapy to help with grief.     11/26- Went to a visit with therapist. She did not feel relief after the visit. I encouraged her to continue if she can but expressed for her to do what was right for her. We discussed increased symptoms likely to happen as we approach 34 weeks. Starting early antepartum testing.

## 2024-12-19 NOTE — ASSESSMENT & PLAN NOTE
I reviewed the risks of +LAC with the patient.  There may be increased risk of venous thromboembolic disease and stroke.  We discussed the pregnancy risks which include prematurity, IUGR, preeclampsia, and miscarriage/stillbirth.     Recommendations:  We recommend aspirin 81 mg daily at confirmation of pregnancy (she is taking)  Growth ultrasounds are recommended at 28, 32, and 36 weeks due to the risk of FGR  Given the risks of stillbirth, twice weekly antepartum testing at 32 weeks gestation is recommended.    Close surveillance in late second and third trimester for preeclampsia  Recommend prophylactic LMWH during pregnancy and for 6 weeks postpartum (see below)  Prophylaxis Regimen:  -For patients with a BMI =< 40, prescribe lovenox 40 mg daily  -For patients with a BMI > 40, prescribe lovenox 40 mg BID  -UFH dose is 7289-7444 units BID, 7500-73389 units BID, and 58071 units BID in the first, second, and third trimesters, respectively    Peripartum Management  -MATYT hose/SCDs should be used while anticoagulation is interrupted.  -Regardless of whether the patient is on prophylactic dose UFH or LMWH, the last dose should be 12 hours prior to neuraxial anesthesia. For this reason, we no longer recommend conversion to UFH at 36 weeks.     Postpartum Management  -In patients with neuraxial anesthesia: prophylactic anticoagulation should not be restarted within 12 hours of neuraxial blockade or within 4 hours of epidural removal, whichever is later  -Prophylactic anticoagulation (defer to timing related to neuraxial anesthesia)  After vaginal delivery: should be started no sooner than 6 hours  After  delivery: should be started no sooner than 12 hours  -Breastfeeding is compatible with warfarin, UFH, and LMWH.

## 2024-12-19 NOTE — TELEPHONE ENCOUNTER
Called to discuss pre-e labs: normal. No evidence of pre-eclampsia  Precautions provided  Will continue to check Bps at home twice daily and submit BP log via email to our office next Monday    Expressed anxiety with current gestational age. Next week she will be 32w and this is the gestational age in which she lost her last baby. Offered reassurance. She has an appt with Dr Tate on Monday and an appt with us next Thursday.

## 2024-12-19 NOTE — ASSESSMENT & PLAN NOTE
Today, she reports for antepartum testing. Initial /57. Repeat /91.   She states she has a recent onset of ankle edema. 1+ pitting edema to bilat LE noted. Denies headaches, RUQ pain. Initially, she reported seeing spots occ (last episode approx 2 weeks ago). Upon further questioning, she experienced these visual disturbances when she changed positions quickly (sitting to standing) and also felt light-headed at the same time. After a few seconds, all symptoms abated. Likely orthostatic hypotension. Educated on warning s/s and when to report to the hospital.  She will start checking her Bps at home and submit a log on Monday for review.  Pre-e labs ordered - will obtain today.

## 2024-12-26 ENCOUNTER — PROCEDURE VISIT (OUTPATIENT)
Dept: MATERNAL FETAL MEDICINE | Facility: CLINIC | Age: 24
End: 2024-12-26
Payer: MEDICAID

## 2024-12-26 ENCOUNTER — PATIENT MESSAGE (OUTPATIENT)
Dept: MATERNAL FETAL MEDICINE | Facility: CLINIC | Age: 24
End: 2024-12-26

## 2024-12-26 ENCOUNTER — OFFICE VISIT (OUTPATIENT)
Dept: MATERNAL FETAL MEDICINE | Facility: CLINIC | Age: 24
End: 2024-12-26
Payer: MEDICAID

## 2024-12-26 VITALS
DIASTOLIC BLOOD PRESSURE: 87 MMHG | HEIGHT: 62 IN | HEART RATE: 96 BPM | BODY MASS INDEX: 26.25 KG/M2 | SYSTOLIC BLOOD PRESSURE: 127 MMHG | WEIGHT: 142.63 LBS

## 2024-12-26 DIAGNOSIS — O99.113 LUPUS ANTICOAGULANT AFFECTING PREGNANCY IN THIRD TRIMESTER, ANTEPARTUM: ICD-10-CM

## 2024-12-26 DIAGNOSIS — D68.62 LUPUS ANTICOAGULANT AFFECTING PREGNANCY IN THIRD TRIMESTER, ANTEPARTUM: ICD-10-CM

## 2024-12-26 DIAGNOSIS — Z87.59 HISTORY OF IUFD: ICD-10-CM

## 2024-12-26 DIAGNOSIS — O09.293 HISTORY OF STILLBIRTH IN PREGNANT PATIENT IN THIRD TRIMESTER, ANTEPARTUM: Primary | ICD-10-CM

## 2024-12-26 DIAGNOSIS — O99.340 ANXIETY ASSOCIATED WITH BIRTHING PROCESS: ICD-10-CM

## 2024-12-26 DIAGNOSIS — F41.9 ANXIETY ASSOCIATED WITH BIRTHING PROCESS: ICD-10-CM

## 2024-12-26 DIAGNOSIS — O16.3 ELEVATED BLOOD PRESSURE AFFECTING PREGNANCY IN THIRD TRIMESTER, ANTEPARTUM: ICD-10-CM

## 2024-12-26 NOTE — ASSESSMENT & PLAN NOTE
She reports a history of anxiety surrounding her recent pregnancy loss. She may be at increased risk for peripartum and postpartum mood disorders. Precautions provided.      We discussed the usage of medications for anxiety in pregnancy if she feels she may need.     It is important to optimize mental health conditions during pregnancy in an effort to reduce the risk of postpartum mood disorders. There are options for management including psychotherapy, counseling, cognitive behavioral therapy, exercise/yoga, journaling, and psychiatry services.     8/14/24- She has complaints of worsening anxiety/depression given her OB history which is understandable. We have discussed options and she is interested in starting a low dose of Prozac. Rx sent. She will talk to her primary OB to reschedule her appts there to be staggered with our clinic so she can check in with a provider approx every 2 weeks.    9/17/24- she did not start the Prozac after our last appointment.  She states that anxiety is still significant at times.  Today she is tearful we had a long discussion after mentioned of choroid plexus cysts on ultrasound today.  I am encouraging her to seek counseling with [facility].    10/7/24- Referral to [facility]    10/31/24- She has contacted [facility], however, has not made an appt. We have encouraged the utilization of counseling/therapy to help with grief.     11/26- Went to a visit with therapist. She did not feel relief after the visit. I encouraged her to continue if she can but expressed for her to do what was right for her. We discussed increased symptoms likely to happen as we approach 34 weeks. Starting early antepartum testing.     12/26- Increasing anxiety d/t current gestational age (prev IUFD at 32w). Discussed Buspar and other meds such as SSRI. She does not want to start meds at this time. Discussed postpartum mood disorders. She would like to wait to start meds until immediate  postpartum (in hospital after delivery).

## 2024-12-26 NOTE — ASSESSMENT & PLAN NOTE
Today, she reports for antepartum testing. Initial /57. Repeat /91.   She states she has a recent onset of ankle edema. 1+ pitting edema to bilat LE noted. Denies headaches, RUQ pain. Initially, she reported seeing spots occ (last episode approx 2 weeks ago). Upon further questioning, she experienced these visual disturbances when she changed positions quickly (sitting to standing) and also felt light-headed at the same time. After a few seconds, all symptoms abated. Likely orthostatic hypotension. Educated on warning s/s and when to report to the hospital.  She will start checking her Bps at home and submit a log on Monday for review.  Pre-e labs ordered - will obtain today.    12/26/24- Pre-e labs normal (UP:C too low to calculate protein). Home BP log has been normal. BP in clinic today 127/87. Precautions provided. Will continue to monitor closely

## 2024-12-26 NOTE — ASSESSMENT & PLAN NOTE
I reviewed the risks of +LAC with the patient.  There may be increased risk of venous thromboembolic disease and stroke.  We discussed the pregnancy risks which include prematurity, IUGR, preeclampsia, and miscarriage/stillbirth.     Recommendations:  We recommend aspirin 81 mg daily at confirmation of pregnancy (she is taking)  Growth ultrasounds are recommended at 28, 32, and 36 weeks due to the risk of FGR  Given the risks of stillbirth, twice weekly antepartum testing at 32 weeks gestation is recommended.    Close surveillance in late second and third trimester for preeclampsia  Recommend prophylactic LMWH during pregnancy and for 6 weeks postpartum (see below)  Prophylaxis Regimen:  -For patients with a BMI =< 40, prescribe lovenox 40 mg daily  -For patients with a BMI > 40, prescribe lovenox 40 mg BID  -UFH dose is 2305-3237 units BID, 7500-68739 units BID, and 40381 units BID in the first, second, and third trimesters, respectively    Peripartum Management  -MATTY hose/SCDs should be used while anticoagulation is interrupted.  -Regardless of whether the patient is on prophylactic dose UFH or LMWH, the last dose should be 12 hours prior to neuraxial anesthesia. For this reason, we no longer recommend conversion to UFH at 36 weeks.     Postpartum Management  -In patients with neuraxial anesthesia: prophylactic anticoagulation should not be restarted within 12 hours of neuraxial blockade or within 4 hours of epidural removal, whichever is later  -Prophylactic anticoagulation (defer to timing related to neuraxial anesthesia)  After vaginal delivery: should be started no sooner than 6 hours  After  delivery: should be started no sooner than 12 hours  -Breastfeeding is compatible with warfarin, UFH, and LMWH.

## 2024-12-26 NOTE — PROGRESS NOTES
"Maternal Fetal Medicine follow up consult    SUBJECTIVE:     Viviane Garnica is a 24 y.o.  female with IUP at 32w2d who is seen in follow up consultation by M.  Pregnancy complications include:   Problem   - Elevated blood pressure affecting pregnancy in third trimester, antepartum   - Lupus anticoagulant affecting pregnancy in third trimester, antepartum   - History of stillbirth in pregnant patient in third trimester, antepartum   - Anxiety associated with birthing process     Viviane presents for routine follow up appointment.  Has been having an increase in anxiety symptoms d/t current gestational age as her previous IUFD was at 32 weeks. Doing well otherwise. I have expressed concern over her mental state in the past and currently. She is very hesitant to start medication due to concern for fetal wellbeing. Delivery at 37w planned.  Denies LOF, VB, contractions. Positive fetal movement.    Previous notes reviewed.   No changes to medical, surgical, family, social, or obstetric history.  Interval history since last M visit: see above  Medications reviewed.    Care team members:  Dr DANNY Tate  - Primary OB     OBJECTIVE:   /87 (BP Location: Right arm, Patient Position: Sitting)   Pulse 96   Ht 5' 2" (1.575 m)   Wt 64.7 kg (142 lb 10.2 oz)   LMP 2024 (Exact Date)   BMI 26.09 kg/m²     Ultrasound performed. See viewpoint for full ultrasound report.    A viable trent pregnancy is visualized in breech presentation.  Estimated fetal weight is at the 33rd percentile with an abdominal circumference at the 45th percentile.    No fetal abnormalities are noted and previous anatomic survey was normal. Amniotic fluid volume is normal.  Placenta is posterior. Biophysical profile is 8/8.     ASSESSMENT/PLAN:     24 y.o.  female with IUP at 32w2d    - History of stillbirth in pregnant patient in third trimester, antepartum  I reviewed the patient's obstetric history which is " remarkable for a previous stillbirth at 32 weeks gestation. Maternal medical history is significant for possible partial placental abruption. There is no significant family history. She was found to have lupus anticoagulant positivity x 1 in December. Repeat was negative and beta 2 glycoprotein and anticardiolipin was negative.   Work-up included:  Genetic testing not done  Antiphospholipid testing (APS):  LAC pos 12/21, repeat neg  B2 glycoprotein IgG/IgM neg  Anticardiolipin IgG/IgM neg  Kleihauer Betke test (or flow cytometry) for fetal-maternal hemorrhage: not done  Type and screen shows no antibodies and RPR neg  Urine toxicology neg  OB Glucose screening was normal  Placental pathology which showed no unusual findings  Autopsy was declined  I counseled the patient regarding the risk for recurrent stillbirth. The recurrence risk depends on the underlying etiology. However, if the cause of fetal death in a low-risk individual was not discovered, the risk of recurrence is estimated to be 7.8 to 10.5 per 1000 births. This pregnancy, she is at risk for FGR and abruption above the population level.     Although she did not have antiphospholipid antibody positivity confirmed on second blood draw, she has an adverse pregnancy event plus LAC+. I recommend starting lovenox prophylaxis (40mg daily) which is associated with reduction in morbidity.     Recommendations:  Serial fetal growth ultrasounds every 4 weeks  Fetal movement awareness, starting at 27-28 weeks  Initiate twice weekly antepartum surveillance at 30 weeks (NSTs on Mondays w/ OB and BPPs on Thursdays w/ MFM)  Due to severe maternal anxiety, will plan for delivery at 37 weeks  Close monitoring for peripartum mood disorders      - Lupus anticoagulant affecting pregnancy in third trimester, antepartum  I reviewed the risks of +LAC with the patient.  There may be increased risk of venous thromboembolic disease and stroke.  We discussed the pregnancy risks  which include prematurity, IUGR, preeclampsia, and miscarriage/stillbirth.     Recommendations:  We recommend aspirin 81 mg daily at confirmation of pregnancy (she is taking)  Growth ultrasounds are recommended at 28, 32, and 36 weeks due to the risk of FGR  Given the risks of stillbirth, twice weekly antepartum testing at 32 weeks gestation is recommended.    Close surveillance in late second and third trimester for preeclampsia  Recommend prophylactic LMWH during pregnancy and for 6 weeks postpartum (see below)  Prophylaxis Regimen:  -For patients with a BMI =< 40, prescribe lovenox 40 mg daily  -For patients with a BMI > 40, prescribe lovenox 40 mg BID  -UFH dose is 7541-7765 units BID, 7500-03138 units BID, and 18520 units BID in the first, second, and third trimesters, respectively    Peripartum Management  -MATTY hose/SCDs should be used while anticoagulation is interrupted.  -Regardless of whether the patient is on prophylactic dose UFH or LMWH, the last dose should be 12 hours prior to neuraxial anesthesia. For this reason, we no longer recommend conversion to UFH at 36 weeks.     Postpartum Management  -In patients with neuraxial anesthesia: prophylactic anticoagulation should not be restarted within 12 hours of neuraxial blockade or within 4 hours of epidural removal, whichever is later  -Prophylactic anticoagulation (defer to timing related to neuraxial anesthesia)  After vaginal delivery: should be started no sooner than 6 hours  After  delivery: should be started no sooner than 12 hours  -Breastfeeding is compatible with warfarin, UFH, and LMWH.      - Anxiety associated with birthing process  She reports a history of anxiety surrounding her recent pregnancy loss. She may be at increased risk for peripartum and postpartum mood disorders. Precautions provided.      We discussed the usage of medications for anxiety in pregnancy if she feels she may need.     It is important to optimize mental health  conditions during pregnancy in an effort to reduce the risk of postpartum mood disorders. There are options for management including psychotherapy, counseling, cognitive behavioral therapy, exercise/yoga, journaling, and psychiatry services.     8/14/24- She has complaints of worsening anxiety/depression given her OB history which is understandable. We have discussed options and she is interested in starting a low dose of Prozac. Rx sent. She will talk to her primary OB to reschedule her appts there to be staggered with our clinic so she can check in with a provider approx every 2 weeks.    9/17/24- she did not start the Prozac after our last appointment.  She states that anxiety is still significant at times.  Today she is tearful we had a long discussion after mentioned of choroid plexus cysts on ultrasound today.  I am encouraging her to seek counseling with [facility].    10/7/24- Referral to [facility]    10/31/24- She has contacted [facility], however, has not made an appt. We have encouraged the utilization of counseling/therapy to help with grief.     11/26- Went to a visit with therapist. She did not feel relief after the visit. I encouraged her to continue if she can but expressed for her to do what was right for her. We discussed increased symptoms likely to happen as we approach 34 weeks. Starting early antepartum testing.     12/26- Increasing anxiety d/t current gestational age (prev IUFD at 32w). Discussed Buspar and other meds such as SSRI. She does not want to start meds at this time. Discussed postpartum mood disorders. She would like to wait to start meds until immediate postpartum (in hospital after delivery).    - Elevated blood pressure affecting pregnancy in third trimester, antepartum  Today, she reports for antepartum testing. Initial /57. Repeat /91.   She states she has a recent onset of ankle edema. 1+ pitting edema to bilat LE noted. Denies headaches, RUQ pain.  Initially, she reported seeing spots occ (last episode approx 2 weeks ago). Upon further questioning, she experienced these visual disturbances when she changed positions quickly (sitting to standing) and also felt light-headed at the same time. After a few seconds, all symptoms abated. Likely orthostatic hypotension. Educated on warning s/s and when to report to the hospital.  She will start checking her Bps at home and submit a log on Monday for review.  Pre-e labs ordered - will obtain today.    12/26/24- Pre-e labs normal (UP:C too low to calculate protein). Home BP log has been normal. BP in clinic today 127/87. Precautions provided. Will continue to monitor closely    F/u at [facility] for once weekly testing (also getting once weekly testing through OB)  F/u in 4 weeks for [facility] visit /growth ultrasound    Christine Sanchez MD  Maternal Fetal Medicine

## 2025-01-02 ENCOUNTER — PROCEDURE VISIT (OUTPATIENT)
Dept: MATERNAL FETAL MEDICINE | Facility: CLINIC | Age: 25
End: 2025-01-02
Payer: MEDICAID

## 2025-01-02 VITALS — SYSTOLIC BLOOD PRESSURE: 125 MMHG | DIASTOLIC BLOOD PRESSURE: 79 MMHG | HEART RATE: 98 BPM

## 2025-01-02 DIAGNOSIS — Z87.59 HISTORY OF IUFD: ICD-10-CM

## 2025-01-02 DIAGNOSIS — D68.62 LUPUS ANTICOAGULANT AFFECTING PREGNANCY IN THIRD TRIMESTER, ANTEPARTUM: ICD-10-CM

## 2025-01-02 DIAGNOSIS — O99.113 LUPUS ANTICOAGULANT AFFECTING PREGNANCY IN THIRD TRIMESTER, ANTEPARTUM: ICD-10-CM

## 2025-01-02 PROCEDURE — 76818 FETAL BIOPHYS PROFILE W/NST: CPT | Mod: S$GLB,,, | Performed by: OBSTETRICS & GYNECOLOGY

## 2025-01-06 ENCOUNTER — LAB REQUISITION (OUTPATIENT)
Dept: LAB | Facility: HOSPITAL | Age: 25
End: 2025-01-06
Payer: MEDICAID

## 2025-01-06 ENCOUNTER — TELEPHONE (OUTPATIENT)
Dept: MATERNAL FETAL MEDICINE | Facility: CLINIC | Age: 25
End: 2025-01-06
Payer: MEDICAID

## 2025-01-06 DIAGNOSIS — N39.0 URINARY TRACT INFECTION, SITE NOT SPECIFIED: ICD-10-CM

## 2025-01-06 PROCEDURE — 87086 URINE CULTURE/COLONY COUNT: CPT | Performed by: OBSTETRICS & GYNECOLOGY

## 2025-01-06 NOTE — TELEPHONE ENCOUNTER
Pt called reporting she had no more refills on her Lovenox, I checked just to make sure, pt did use all refills, I informed pt I will call in refills for her, pt verbalized understanding.    I called [facility] in [place], rx refill x4 given on Drs voicemail.

## 2025-01-08 ENCOUNTER — TELEPHONE (OUTPATIENT)
Dept: MATERNAL FETAL MEDICINE | Facility: CLINIC | Age: 25
End: 2025-01-08
Payer: MEDICAID

## 2025-01-08 LAB — BACTERIA UR CULT: NORMAL

## 2025-01-08 NOTE — TELEPHONE ENCOUNTER
[facility] pharmacy called stating pt's Lovenox needed a PA,  I called pt's insurance spoke with NOLBERTO Parrish approved #[id number]  until 7/8/25. Pharmacy has been informed of PA approval.

## 2025-01-09 ENCOUNTER — PROCEDURE VISIT (OUTPATIENT)
Dept: MATERNAL FETAL MEDICINE | Facility: CLINIC | Age: 25
End: 2025-01-09
Payer: MEDICAID

## 2025-01-09 VITALS — HEART RATE: 95 BPM | DIASTOLIC BLOOD PRESSURE: 85 MMHG | SYSTOLIC BLOOD PRESSURE: 131 MMHG

## 2025-01-09 DIAGNOSIS — O99.113 LUPUS ANTICOAGULANT AFFECTING PREGNANCY IN THIRD TRIMESTER, ANTEPARTUM: ICD-10-CM

## 2025-01-09 DIAGNOSIS — D68.62 LUPUS ANTICOAGULANT AFFECTING PREGNANCY IN THIRD TRIMESTER, ANTEPARTUM: ICD-10-CM

## 2025-01-09 DIAGNOSIS — Z87.59 HISTORY OF IUFD: ICD-10-CM

## 2025-01-09 PROCEDURE — 76818 FETAL BIOPHYS PROFILE W/NST: CPT | Mod: S$GLB,,, | Performed by: OBSTETRICS & GYNECOLOGY

## 2025-01-16 ENCOUNTER — OFFICE VISIT (OUTPATIENT)
Dept: MATERNAL FETAL MEDICINE | Facility: CLINIC | Age: 25
End: 2025-01-16
Payer: MEDICAID

## 2025-01-16 ENCOUNTER — PROCEDURE VISIT (OUTPATIENT)
Dept: MATERNAL FETAL MEDICINE | Facility: CLINIC | Age: 25
End: 2025-01-16
Payer: MEDICAID

## 2025-01-16 VITALS
SYSTOLIC BLOOD PRESSURE: 133 MMHG | HEART RATE: 99 BPM | HEIGHT: 62 IN | DIASTOLIC BLOOD PRESSURE: 87 MMHG | BODY MASS INDEX: 27.28 KG/M2 | WEIGHT: 148.25 LBS

## 2025-01-16 DIAGNOSIS — F41.9 ANXIETY ASSOCIATED WITH BIRTHING PROCESS: ICD-10-CM

## 2025-01-16 DIAGNOSIS — O16.3 ELEVATED BLOOD PRESSURE AFFECTING PREGNANCY IN THIRD TRIMESTER, ANTEPARTUM: Primary | ICD-10-CM

## 2025-01-16 DIAGNOSIS — Z87.59 HISTORY OF IUFD: ICD-10-CM

## 2025-01-16 DIAGNOSIS — O99.113 LUPUS ANTICOAGULANT AFFECTING PREGNANCY IN THIRD TRIMESTER, ANTEPARTUM: ICD-10-CM

## 2025-01-16 DIAGNOSIS — O99.340 ANXIETY ASSOCIATED WITH BIRTHING PROCESS: ICD-10-CM

## 2025-01-16 DIAGNOSIS — D68.62 LUPUS ANTICOAGULANT AFFECTING PREGNANCY IN THIRD TRIMESTER, ANTEPARTUM: ICD-10-CM

## 2025-01-16 DIAGNOSIS — O09.293 HISTORY OF STILLBIRTH IN PREGNANT PATIENT IN THIRD TRIMESTER, ANTEPARTUM: ICD-10-CM

## 2025-01-16 PROCEDURE — 3079F DIAST BP 80-89 MM HG: CPT | Mod: CPTII,S$GLB,, | Performed by: OBSTETRICS & GYNECOLOGY

## 2025-01-16 PROCEDURE — 99213 OFFICE O/P EST LOW 20 MIN: CPT | Mod: 25,TH,S$GLB, | Performed by: OBSTETRICS & GYNECOLOGY

## 2025-01-16 PROCEDURE — 59025 FETAL NON-STRESS TEST: CPT | Mod: S$GLB,,, | Performed by: OBSTETRICS & GYNECOLOGY

## 2025-01-16 PROCEDURE — 76816 OB US FOLLOW-UP PER FETUS: CPT | Mod: S$GLB,,, | Performed by: OBSTETRICS & GYNECOLOGY

## 2025-01-16 PROCEDURE — 3008F BODY MASS INDEX DOCD: CPT | Mod: CPTII,S$GLB,, | Performed by: OBSTETRICS & GYNECOLOGY

## 2025-01-16 PROCEDURE — 1159F MED LIST DOCD IN RCRD: CPT | Mod: CPTII,S$GLB,, | Performed by: OBSTETRICS & GYNECOLOGY

## 2025-01-16 PROCEDURE — 1160F RVW MEDS BY RX/DR IN RCRD: CPT | Mod: CPTII,S$GLB,, | Performed by: OBSTETRICS & GYNECOLOGY

## 2025-01-16 PROCEDURE — 3075F SYST BP GE 130 - 139MM HG: CPT | Mod: CPTII,S$GLB,, | Performed by: OBSTETRICS & GYNECOLOGY

## 2025-01-16 NOTE — ASSESSMENT & PLAN NOTE
I reviewed the risks of +LAC with the patient.  There may be increased risk of venous thromboembolic disease and stroke.  We discussed the pregnancy risks which include prematurity, IUGR, preeclampsia, and miscarriage/stillbirth.     Recommendations:  We recommend aspirin 81 mg daily at confirmation of pregnancy (she is taking)  Growth ultrasounds are recommended at 28, 32, and 36 weeks due to the risk of FGR  Given the risks of stillbirth, twice weekly antepartum testing at 32 weeks gestation is recommended.    Close surveillance in late second and third trimester for preeclampsia  Recommend prophylactic LMWH during pregnancy and for 6 weeks postpartum (see below)  Prophylaxis Regimen:  -For patients with a BMI =< 40, prescribe lovenox 40 mg daily  -For patients with a BMI > 40, prescribe lovenox 40 mg BID  -UFH dose is 5828-5161 units BID, 7500-39853 units BID, and 41137 units BID in the first, second, and third trimesters, respectively    Peripartum Management  -MATTY hose/SCDs should be used while anticoagulation is interrupted.  -Regardless of whether the patient is on prophylactic dose UFH or LMWH, the last dose should be 12 hours prior to neuraxial anesthesia. For this reason, we no longer recommend conversion to UFH at 36 weeks.     Postpartum Management  -In patients with neuraxial anesthesia: prophylactic anticoagulation should not be restarted within 12 hours of neuraxial blockade or within 4 hours of epidural removal, whichever is later  -Prophylactic anticoagulation (defer to timing related to neuraxial anesthesia)  After vaginal delivery: should be started no sooner than 6 hours  After  delivery: should be started no sooner than 12 hours  -Breastfeeding is compatible with warfarin, UFH, and LMWH.

## 2025-01-16 NOTE — ASSESSMENT & PLAN NOTE
Today, she reports for antepartum testing. Initial /57. Repeat /91.   She states she has a recent onset of ankle edema. 1+ pitting edema to bilat LE noted. Denies headaches, RUQ pain. Initially, she reported seeing spots occ (last episode approx 2 weeks ago). Upon further questioning, she experienced these visual disturbances when she changed positions quickly (sitting to standing) and also felt light-headed at the same time. After a few seconds, all symptoms abated. Likely orthostatic hypotension. Educated on warning s/s and when to report to the hospital.  She will start checking her Bps at home and submit a log on Monday for review.  Pre-e labs ordered - will obtain today.    12/26/24- Pre-e labs normal (UP:C too low to calculate protein). Home BP log has been normal. BP in clinic today 127/87. Precautions provided. Will continue to monitor closely    1/16/25- No further elevations

## 2025-01-16 NOTE — ASSESSMENT & PLAN NOTE
She reports a history of anxiety surrounding her recent pregnancy loss. She may be at increased risk for peripartum and postpartum mood disorders. Precautions provided.      We discussed the usage of medications for anxiety in pregnancy if she feels she may need.     It is important to optimize mental health conditions during pregnancy in an effort to reduce the risk of postpartum mood disorders. There are options for management including psychotherapy, counseling, cognitive behavioral therapy, exercise/yoga, journaling, and psychiatry services.     8/14/24- She has complaints of worsening anxiety/depression given her OB history which is understandable. We have discussed options and she is interested in starting a low dose of Prozac. Rx sent. She will talk to her primary OB to reschedule her appts there to be staggered with our clinic so she can check in with a provider approx every 2 weeks.    9/17/24- she did not start the Prozac after our last appointment.  She states that anxiety is still significant at times.  Today she is tearful we had a long discussion after mentioned of choroid plexus cysts on ultrasound today.  I am encouraging her to seek counseling with [facility].    10/7/24- Referral to [facility]    10/31/24- She has contacted [facility], however, has not made an appt. We have encouraged the utilization of counseling/therapy to help with grief.     11/26- Went to a visit with therapist. She did not feel relief after the visit. I encouraged her to continue if she can but expressed for her to do what was right for her. We discussed increased symptoms likely to happen as we approach 34 weeks. Starting early antepartum testing.     12/26- Increasing anxiety d/t current gestational age (prev IUFD at 32w). Discussed Buspar and other meds such as SSRI. She does not want to start meds at this time. Discussed postpartum mood disorders. She would like to wait to start meds until immediate  postpartum (in hospital after delivery).    1/16/25- Worsening anxiety d/t her OB being unable to deliver at [facility]. Prior IUFD delivered at [facility] and she's afraid this may trigger PTSD for her by delivering here for her current pregnancy. Discussed anxiety at length.

## 2025-01-16 NOTE — PROGRESS NOTES
"Maternal Fetal Medicine follow up consult    SUBJECTIVE:     Viviane Garnica is a 24 y.o.  female with IUP at 35w2d who is seen in follow up consultation by MFM.  Pregnancy complications include:   Problem   - Elevated blood pressure affecting pregnancy in third trimester, antepartum   - Lupus anticoagulant affecting pregnancy in third trimester, antepartum   - History of stillbirth in pregnant patient in third trimester, antepartum   - Anxiety associated with birthing process     Viviane presents for routine follow up appointment.  Has increasing anxiety as she nears delivery timing. OB unable to deliver at [facility] - delivered IUFD at [facility] and is fearful delivering here may trigger PTSD symptoms.   Denies LOF, VB, contractions. Positive fetal movement.    Previous notes reviewed.   No changes to medical, surgical, family, social, or obstetric history.  Interval history since last MFM visit: see above  Medications reviewed.    Care team members:  Dr DANNY Tate - Primary OB     OBJECTIVE:   /87 (BP Location: Right arm, Patient Position: Sitting)   Pulse 99   Ht 5' 2" (1.575 m)   Wt 67.3 kg (148 lb 4.2 oz)   LMP 2024 (Exact Date)   BMI 27.12 kg/m²     Ultrasound performed. See viewpoint for full ultrasound report.    A viable trent pregnancy is visualized in cephalic presentation.  Estimated fetal weight is at the 68th percentile with an abdominal circumference at the 96th percentile.    No fetal abnormalities are noted and previous anatomic survey was normal. Amniotic fluid volume is normal.  Placenta is posterior. Biophysical profile is 10/10. NST was reactive.    ASSESSMENT/PLAN:     24 y.o.  female with IUP at 35w2d    - Elevated blood pressure affecting pregnancy in third trimester, antepartum  Today, she reports for antepartum testing. Initial /57. Repeat /91.   She states she has a recent onset of ankle edema. 1+ pitting edema to bilat LE noted. Denies " headaches, RUQ pain. Initially, she reported seeing spots occ (last episode approx 2 weeks ago). Upon further questioning, she experienced these visual disturbances when she changed positions quickly (sitting to standing) and also felt light-headed at the same time. After a few seconds, all symptoms abated. Likely orthostatic hypotension. Educated on warning s/s and when to report to the hospital.  She will start checking her Bps at home and submit a log on Monday for review.  Pre-e labs ordered - will obtain today.    12/26/24- Pre-e labs normal (UP:C too low to calculate protein). Home BP log has been normal. BP in clinic today 127/87. Precautions provided. Will continue to monitor closely    1/16/25- No further elevations    - Anxiety associated with birthing process  She reports a history of anxiety surrounding her recent pregnancy loss. She may be at increased risk for peripartum and postpartum mood disorders. Precautions provided.      We discussed the usage of medications for anxiety in pregnancy if she feels she may need.     It is important to optimize mental health conditions during pregnancy in an effort to reduce the risk of postpartum mood disorders. There are options for management including psychotherapy, counseling, cognitive behavioral therapy, exercise/yoga, journaling, and psychiatry services.     8/14/24- She has complaints of worsening anxiety/depression given her OB history which is understandable. We have discussed options and she is interested in starting a low dose of Prozac. Rx sent. She will talk to her primary OB to reschedule her appts there to be staggered with our clinic so she can check in with a provider approx every 2 weeks.    9/17/24- she did not start the Prozac after our last appointment.  She states that anxiety is still significant at times.  Today she is tearful we had a long discussion after mentioned of choroid plexus cysts on ultrasound today.  I am encouraging her to seek  counseling with [facility].    10/7/24- Referral to [facility]    10/31/24- She has contacted [facility], however, has not made an appt. We have encouraged the utilization of counseling/therapy to help with grief.     11/26- Went to a visit with therapist. She did not feel relief after the visit. I encouraged her to continue if she can but expressed for her to do what was right for her. We discussed increased symptoms likely to happen as we approach 34 weeks. Starting early antepartum testing.     12/26- Increasing anxiety d/t current gestational age (prev IUFD at 32w). Discussed Buspar and other meds such as SSRI. She does not want to start meds at this time. Discussed postpartum mood disorders. She would like to wait to start meds until immediate postpartum (in hospital after delivery).    1/16/25- Worsening anxiety d/t her OB being unable to deliver at [facility]. Prior IUFD delivered at [facility] and she's afraid this may trigger PTSD for her by delivering here for her current pregnancy. Discussed anxiety at length.    - Lupus anticoagulant affecting pregnancy in third trimester, antepartum  I reviewed the risks of +LAC with the patient.  There may be increased risk of venous thromboembolic disease and stroke.  We discussed the pregnancy risks which include prematurity, IUGR, preeclampsia, and miscarriage/stillbirth.     Recommendations:  We recommend aspirin 81 mg daily at confirmation of pregnancy (she is taking)  Growth ultrasounds are recommended at 28, 32, and 36 weeks due to the risk of FGR  Given the risks of stillbirth, twice weekly antepartum testing at 32 weeks gestation is recommended.    Close surveillance in late second and third trimester for preeclampsia  Recommend prophylactic LMWH during pregnancy and for 6 weeks postpartum (see below)  Prophylaxis Regimen:  -For patients with a BMI =< 40, prescribe lovenox 40 mg daily  -For patients with a BMI > 40, prescribe lovenox 40 mg BID  -UFH dose  is 8660-3093 units BID, 7500-01422 units BID, and 75558 units BID in the first, second, and third trimesters, respectively    Peripartum Management  -MATTY hose/SCDs should be used while anticoagulation is interrupted.  -Regardless of whether the patient is on prophylactic dose UFH or LMWH, the last dose should be 12 hours prior to neuraxial anesthesia. For this reason, we no longer recommend conversion to UFH at 36 weeks.     Postpartum Management  -In patients with neuraxial anesthesia: prophylactic anticoagulation should not be restarted within 12 hours of neuraxial blockade or within 4 hours of epidural removal, whichever is later  -Prophylactic anticoagulation (defer to timing related to neuraxial anesthesia)  After vaginal delivery: should be started no sooner than 6 hours  After  delivery: should be started no sooner than 12 hours  -Breastfeeding is compatible with warfarin, UFH, and LMWH.      - History of stillbirth in pregnant patient in third trimester, antepartum  I reviewed the patient's obstetric history which is remarkable for a previous stillbirth at 32 weeks gestation. Maternal medical history is significant for possible partial placental abruption. There is no significant family history. She was found to have lupus anticoagulant positivity x 1 in December. Repeat was negative and beta 2 glycoprotein and anticardiolipin was negative.   Work-up included:  Genetic testing not done  Antiphospholipid testing (APS):  LAC pos , repeat neg  B2 glycoprotein IgG/IgM neg  Anticardiolipin IgG/IgM neg  Kleihauer Betke test (or flow cytometry) for fetal-maternal hemorrhage: not done  Type and screen shows no antibodies and RPR neg  Urine toxicology neg  OB Glucose screening was normal  Placental pathology which showed no unusual findings  Autopsy was declined  I counseled the patient regarding the risk for recurrent stillbirth. The recurrence risk depends on the underlying etiology. However, if the  cause of fetal death in a low-risk individual was not discovered, the risk of recurrence is estimated to be 7.8 to 10.5 per 1000 births. This pregnancy, she is at risk for FGR and abruption above the population level.     Although she did not have antiphospholipid antibody positivity confirmed on second blood draw, she has an adverse pregnancy event plus LAC+. I recommend starting lovenox prophylaxis (40mg daily) which is associated with reduction in morbidity.     Recommendations:  Serial fetal growth ultrasounds every 4 weeks  Fetal movement awareness, starting at 27-28 weeks  Initiate twice weekly antepartum surveillance at 30 weeks (NSTs on Mondays w/ OB and BPPs on Thursdays w/ MFM)  Due to severe maternal anxiety, will plan for delivery at 37 weeks  Close monitoring for peripartum mood disorders      F/u weekly antepartum testing until delivery    Christine Sanchez MD  Maternal Fetal Medicine

## 2025-01-22 ENCOUNTER — PATIENT MESSAGE (OUTPATIENT)
Dept: MATERNAL FETAL MEDICINE | Facility: CLINIC | Age: 25
End: 2025-01-22
Payer: MEDICAID

## 2025-01-23 ENCOUNTER — PATIENT MESSAGE (OUTPATIENT)
Dept: MATERNAL FETAL MEDICINE | Facility: CLINIC | Age: 25
End: 2025-01-23

## 2025-01-24 PROBLEM — O14.93 PRE-ECLAMPSIA IN THIRD TRIMESTER: Status: ACTIVE | Noted: 2025-01-24

## 2025-01-24 PROBLEM — O16.3 ELEVATED BLOOD PRESSURE AFFECTING PREGNANCY IN THIRD TRIMESTER, ANTEPARTUM: Status: RESOLVED | Noted: 2024-12-19 | Resolved: 2025-01-24
